# Patient Record
Sex: FEMALE | Race: WHITE | NOT HISPANIC OR LATINO | Employment: OTHER | URBAN - METROPOLITAN AREA
[De-identification: names, ages, dates, MRNs, and addresses within clinical notes are randomized per-mention and may not be internally consistent; named-entity substitution may affect disease eponyms.]

---

## 2017-03-02 ENCOUNTER — TRANSCRIBE ORDERS (OUTPATIENT)
Dept: ADMINISTRATIVE | Facility: HOSPITAL | Age: 62
End: 2017-03-02

## 2017-03-02 DIAGNOSIS — H93.19 TINNITUS, UNSPECIFIED LATERALITY: ICD-10-CM

## 2017-03-02 DIAGNOSIS — Z13.9 SCREENING: Primary | ICD-10-CM

## 2017-03-02 DIAGNOSIS — H91.90 HEARING LOSS, UNSPECIFIED LATERALITY: Primary | ICD-10-CM

## 2017-03-07 ENCOUNTER — TRANSCRIBE ORDERS (OUTPATIENT)
Dept: ADMINISTRATIVE | Facility: HOSPITAL | Age: 62
End: 2017-03-07

## 2017-03-07 DIAGNOSIS — H93.19 TINNITUS, UNSPECIFIED LATERALITY: Primary | ICD-10-CM

## 2017-03-07 DIAGNOSIS — H91.90 HEARING LOSS, UNSPECIFIED LATERALITY: ICD-10-CM

## 2017-03-08 ENCOUNTER — APPOINTMENT (OUTPATIENT)
Dept: CT IMAGING | Facility: HOSPITAL | Age: 62
End: 2017-03-08

## 2017-03-08 ENCOUNTER — HOSPITAL ENCOUNTER (OUTPATIENT)
Dept: CT IMAGING | Facility: HOSPITAL | Age: 62
End: 2017-03-08

## 2017-03-10 ENCOUNTER — HOSPITAL ENCOUNTER (OUTPATIENT)
Dept: MRI IMAGING | Facility: HOSPITAL | Age: 62
Discharge: HOME | End: 2017-03-10

## 2017-03-10 DIAGNOSIS — H91.90 HEARING LOSS, UNSPECIFIED LATERALITY: ICD-10-CM

## 2017-03-10 DIAGNOSIS — H93.19 TINNITUS, UNSPECIFIED LATERALITY: ICD-10-CM

## 2017-03-13 ENCOUNTER — HOSPITAL ENCOUNTER (OUTPATIENT)
Dept: MRI IMAGING | Facility: HOSPITAL | Age: 62
Discharge: HOME | End: 2017-03-13
Admitting: NURSE PRACTITIONER

## 2017-03-13 PROCEDURE — 0 GADOBENATE DIMEGLUMINE 529 MG/ML SOLUTION: Performed by: NURSE PRACTITIONER

## 2017-03-13 PROCEDURE — A9577 INJ MULTIHANCE: HCPCS | Performed by: NURSE PRACTITIONER

## 2017-03-13 PROCEDURE — 70553 MRI BRAIN STEM W/O & W/DYE: CPT

## 2017-03-13 RX ADMIN — GADOBENATE DIMEGLUMINE 19 ML: 529 INJECTION, SOLUTION INTRAVENOUS at 11:53

## 2017-12-19 ENCOUNTER — TRANSCRIBE ORDERS (OUTPATIENT)
Dept: ADMINISTRATIVE | Facility: HOSPITAL | Age: 62
End: 2017-12-19

## 2017-12-19 DIAGNOSIS — Z12.31 VISIT FOR SCREENING MAMMOGRAM: Primary | ICD-10-CM

## 2018-01-09 ENCOUNTER — APPOINTMENT (OUTPATIENT)
Dept: MAMMOGRAPHY | Facility: HOSPITAL | Age: 63
End: 2018-01-09

## 2018-04-30 RX ORDER — LEVOTHYROXINE SODIUM 0.05 MG/1
50 TABLET ORAL EVERY MORNING
COMMUNITY
End: 2020-05-18

## 2018-04-30 RX ORDER — LISINOPRIL 2.5 MG/1
2.5 TABLET ORAL DAILY
COMMUNITY
Start: 2017-09-29 | End: 2018-06-14

## 2018-05-01 ENCOUNTER — OFFICE VISIT (OUTPATIENT)
Dept: SURGERY | Facility: CLINIC | Age: 63
End: 2018-05-01

## 2018-05-01 ENCOUNTER — PREP FOR SURGERY (OUTPATIENT)
Dept: OTHER | Facility: HOSPITAL | Age: 63
End: 2018-05-01

## 2018-05-01 VITALS
SYSTOLIC BLOOD PRESSURE: 132 MMHG | TEMPERATURE: 98.1 F | HEIGHT: 59 IN | BODY MASS INDEX: 43.55 KG/M2 | HEART RATE: 78 BPM | WEIGHT: 216 LBS | OXYGEN SATURATION: 100 % | DIASTOLIC BLOOD PRESSURE: 80 MMHG

## 2018-05-01 DIAGNOSIS — K64.8 INTERNAL HEMORRHOIDS WITH COMPLICATION: Primary | ICD-10-CM

## 2018-05-01 DIAGNOSIS — K64.4 EXTERNAL HEMORRHOIDS WITH COMPLICATION: ICD-10-CM

## 2018-05-01 PROCEDURE — 99244 OFF/OP CNSLTJ NEW/EST MOD 40: CPT | Performed by: COLON & RECTAL SURGERY

## 2018-05-01 PROCEDURE — 46600 DIAGNOSTIC ANOSCOPY SPX: CPT | Performed by: COLON & RECTAL SURGERY

## 2018-05-01 RX ORDER — CEFAZOLIN SODIUM 1 G/50ML
1 INJECTION, SOLUTION INTRAVENOUS ONCE
Status: CANCELLED | OUTPATIENT
Start: 2018-06-21 | End: 2018-06-21

## 2018-05-01 NOTE — PROGRESS NOTES
Nadia Valverde is a 62 y.o. female who is seen as a consult at the request of Simeon Stuart* for extra anal tissue and blood per rectum    HPI:    Pt c/o problems with hemorrhoids  She sees blood with every BM for the past 2-3 years    Sh states extra tissue at anus will not go back in    She uses otc creams and wipes, but symptoms persist    BMs are regular    She states she eats a high-fiber diet  She eats prunes for her BMs, which is helpful    Most recent colonoscopy Dr. Conn 2015: Inflamed tubular adenoma descending colon    FamHx: CRC mother    Next appt for thyroid levels in September    Past Medical History:   Diagnosis Date   • Arthritis    • Colon polyps    • Cystocele with rectocele    • Dyspepsia    • Gastritis    • Hearing loss    • Heartburn    • History of cervical dysplasia    • Hypertension    • Hyperthyroidism    • Mastodynia    • Migraine    • Ovarian cyst 11/2015    RIGHT   • Primary osteoarthritis of right knee    • Reflux esophagitis    • Tinnitus    • Uterine prolapse    • Varicose veins of bilateral lower extremities with other complications        Past Surgical History:   Procedure Laterality Date   • COLONOSCOPY W/ POLYPECTOMY N/A 12/04/2015    SIGMOID DIVERTICULOSIS, 1 TUBULAR ADENOMA COLON POLYP, RESCOPE IN 5 YRS, DR. SIMEON CONN   • D&C HYSTEROSCOPY N/A    • ENDOSCOPY N/A 4/29/2016    NODULAR GASTRITIS, REFLUX ESOPHAGITIS, BX:BENIGN, DR. SIMEON CONN AT [facility]   • LAPAROSCOPIC TUBAL LIGATION     • TUBAL ABDOMINAL LIGATION Bilateral    • VEIN LIGATION AND STRIPPING Left        Social History:   reports that she has quit smoking. She has never used smokeless tobacco. She reports that she drinks alcohol. She reports that she does not use drugs.      Marriage status:     Family History   Problem Relation Age of Onset   • Heart disease Father    • Diabetes Mother    • Colon cancer Mother    • Colon polyps Mother    • Hypertension Sister    • Colon polyps Sister     • Hypertension Sister          Current Outpatient Prescriptions:   •  Calcium 150 MG tablet, Take 1 tablet by mouth 1 (One) Time As Needed., Disp: , Rfl:   •  levothyroxine (SYNTHROID, LEVOTHROID) 50 MCG tablet, Take 50 mcg by mouth Daily., Disp: , Rfl:   •  RANITIDINE HCL PO, Take 1 tablet by mouth As Needed., Disp: , Rfl:   •  lisinopril (PRINIVIL,ZESTRIL) 2.5 MG tablet, Take 2.5 mg by mouth Daily., Disp: , Rfl:     Allergy  Levaquin [levofloxacin]    Review of Systems   HENT: Positive for hearing loss.    Respiratory: Positive for snoring.    Musculoskeletal: Positive for arthritis, back pain, joint pain and joint swelling.   Neurological: Positive for dizziness and headaches.   All other systems reviewed and are negative.      Vitals:    05/01/18 0922   BP: 132/80   Pulse: 78   Temp: 98.1 °F (36.7 °C)   SpO2: 100%     Body mass index is 43.63 kg/m².    Physical Exam   Constitutional: She is oriented to person, place, and time. She appears well-developed and well-nourished. No distress.   HENT:   Head: Normocephalic and atraumatic.   Nose: Nose normal.   Mouth/Throat: Oropharynx is clear and moist.   Eyes: Conjunctivae and EOM are normal. Pupils are equal, round, and reactive to light.   Neck: Normal range of motion. No tracheal deviation present.   Pulmonary/Chest: Effort normal and breath sounds normal. No respiratory distress.   Abdominal: Soft. Bowel sounds are normal. She exhibits no distension.   Genitourinary:   Genitourinary Comments: Perianal exam: external hem - enlarged,  IH prolapsing  ARON- good tone, no masses  Anoscopy performed:  Grade 3 x 3 internal hem   Musculoskeletal: Normal range of motion. She exhibits no edema or deformity.   Neurological: She is alert and oriented to person, place, and time. No cranial nerve deficit. Coordination and gait normal.   Skin: Skin is warm and dry.   Psychiatric: She has a normal mood and affect. Her behavior is normal. Judgment normal.       Review of Medical  Record:  I reviewed colonoscopy Dr. Sheriff 2015: inflamed tubular adenoma descending colon    Assessment:  1. Internal hemorrhoids with complication    2. External hemorrhoids with complication        Plan:    I recommend hemorrhoidectomy.  I discussed risk including bleeding, infection, injury to sphincters; benefits; and alternatives.  I discussed in detail expected recovery, possible urinary retention, time off activities, and healing.  Patient expresses understanding and wishes to proceed.    In the meantime, I recommend fiber therapy and detailed and gave written instructions on how to achieve a high fiber diet.      Scribed for Valery Alanis MD by Brianda Ragsdale PA-C 5/1/2018  This patient was evaluated by me, recommendations made, documentation reviewed, edited, and revised by me, Valery Alanis MD

## 2018-06-14 ENCOUNTER — APPOINTMENT (OUTPATIENT)
Dept: PREADMISSION TESTING | Facility: HOSPITAL | Age: 63
End: 2018-06-14

## 2018-06-14 VITALS
TEMPERATURE: 97.6 F | BODY MASS INDEX: 41.98 KG/M2 | OXYGEN SATURATION: 97 % | RESPIRATION RATE: 16 BRPM | SYSTOLIC BLOOD PRESSURE: 121 MMHG | HEIGHT: 60 IN | HEART RATE: 74 BPM | WEIGHT: 213.8 LBS | DIASTOLIC BLOOD PRESSURE: 84 MMHG

## 2018-06-14 LAB
ANION GAP SERPL CALCULATED.3IONS-SCNC: 12.2 MMOL/L
BUN BLD-MCNC: 16 MG/DL (ref 8–23)
BUN/CREAT SERPL: 21.9 (ref 7–25)
CALCIUM SPEC-SCNC: 9.2 MG/DL (ref 8.6–10.5)
CHLORIDE SERPL-SCNC: 105 MMOL/L (ref 98–107)
CO2 SERPL-SCNC: 23.8 MMOL/L (ref 22–29)
CREAT BLD-MCNC: 0.73 MG/DL (ref 0.57–1)
DEPRECATED RDW RBC AUTO: 51.3 FL (ref 37–54)
ERYTHROCYTE [DISTWIDTH] IN BLOOD BY AUTOMATED COUNT: 16.1 % (ref 11.7–13)
GFR SERPL CREATININE-BSD FRML MDRD: 81 ML/MIN/1.73
GLUCOSE BLD-MCNC: 115 MG/DL (ref 65–99)
HCT VFR BLD AUTO: 41 % (ref 35.6–45.5)
HGB BLD-MCNC: 12.6 G/DL (ref 11.9–15.5)
MCH RBC QN AUTO: 26.4 PG (ref 26.9–32)
MCHC RBC AUTO-ENTMCNC: 30.7 G/DL (ref 32.4–36.3)
MCV RBC AUTO: 86 FL (ref 80.5–98.2)
PLATELET # BLD AUTO: 218 10*3/MM3 (ref 140–500)
PMV BLD AUTO: 10.8 FL (ref 6–12)
POTASSIUM BLD-SCNC: 3.7 MMOL/L (ref 3.5–5.2)
RBC # BLD AUTO: 4.77 10*6/MM3 (ref 3.9–5.2)
SODIUM BLD-SCNC: 141 MMOL/L (ref 136–145)
WBC NRBC COR # BLD: 7.49 10*3/MM3 (ref 4.5–10.7)

## 2018-06-14 PROCEDURE — 93005 ELECTROCARDIOGRAM TRACING: CPT

## 2018-06-14 PROCEDURE — 85027 COMPLETE CBC AUTOMATED: CPT | Performed by: COLON & RECTAL SURGERY

## 2018-06-14 PROCEDURE — 80048 BASIC METABOLIC PNL TOTAL CA: CPT | Performed by: COLON & RECTAL SURGERY

## 2018-06-14 PROCEDURE — 93010 ELECTROCARDIOGRAM REPORT: CPT | Performed by: INTERNAL MEDICINE

## 2018-06-14 PROCEDURE — 36415 COLL VENOUS BLD VENIPUNCTURE: CPT

## 2018-06-14 RX ORDER — RANITIDINE 150 MG/1
150 TABLET ORAL AS NEEDED
COMMUNITY
End: 2020-05-18

## 2018-06-14 NOTE — DISCHARGE INSTRUCTIONS
Take the following medications the morning of surgery with a small sip of water:  NONE    Arrive to hospital on your day of surgery at 5:30 AM.      General Instructions:  • Do not eat solid food after midnight the night before surgery.  • You may drink clear liquids day of surgery but must stop at least one hour before your hospital arrival time (NOTHING AFTER 4:30 AM)  • It is beneficial for you to have a clear drink that contains carbohydrates the day of surgery.  We suggest a 12 to 20 ounce bottle of Gatorade or Powerade for non-diabetic patients or a 12 to 20 ounce bottle of G2 or Powerade Zero for diabetic patients. (Pediatric patients, are not advised to drink a 12 to 20 ounce carbohydrate drink)    Clear liquids are liquids you can see through.  Nothing red in color.     Plain water                               Sports drinks  Sodas                                   Gelatin (Jell-O)  Fruit juices without pulp such as white grape juice and apple juice  Popsicles that contain no fruit or yogurt  Tea or coffee (no cream or milk added)  Gatorade / Powerade  G2 / Powerade Zero    • Infants may have breast milk up to four hours before surgery.  • Infants drinking formula may drink formula up to six hours before surgery.   • Patients who avoid smoking, chewing tobacco and alcohol for 4 weeks prior to surgery have a reduced risk of post-operative complications.  Quit smoking as many days before surgery as you can.  • Do not smoke, use chewing tobacco or drink alcohol the day of surgery.   • If applicable bring your C-PAP/ BI-PAP machine.  • Bring any papers given to you in the doctor’s office.  • Wear clean comfortable clothes and socks.  • Do not wear contact lenses or make-up.  Bring a case for your glasses.   • Bring crutches or walker if applicable.  • Remove all piercings.  Leave jewelry and any other valuables at home.  • Hair extensions with metal clips must be removed prior to surgery.  • The Pre-Admission  Testing nurse will instruct you to bring medications if unable to obtain an accurate list in Pre-Admission Testing.        If you were given a blood bank ID arm band remember to bring it with you the day of surgery.    Preventing a Surgical Site Infection:  • For 2 to 3 days before surgery, avoid shaving with a razor because the razor can irritate skin and make it easier to develop an infection.  • The night prior to surgery sleep in a clean bed with clean clothing.  Do not allow pets to sleep with you.  • Shower on the morning of surgery using a fresh bar of anti-bacterial soap (such as Dial) and clean washcloth.  Dry with a clean towel and dress in clean clothing.  • Ask your surgeon if you will be receiving antibiotics prior to surgery.  • Make sure you, your family, and all healthcare providers clean their hands with soap and water or an alcohol based hand  before caring for you or your wound.    Day of surgery:  Upon arrival, a Pre-op nurse and Anesthesiologist will review your health history, obtain vital signs, and answer questions you may have.  The only belongings needed at this time will be your home medications and if applicable your C-PAP/BI-PAP machine.  If you are staying overnight your family can leave the rest of your belongings in the car and bring them to your room later.  A Pre-op nurse will start an IV and you may receive medication in preparation for surgery, including something to help you relax.  Your family will be able to see you in the Pre-op area.  While you are in surgery your family should notify the waiting room  if they leave the waiting room area and provide a contact phone number.    Please be aware that surgery does come with discomfort.  We want to make every effort to control your discomfort so please discuss any uncontrolled symptoms with your nurse.   Your doctor will most likely have prescribed pain medications.      If you are going home after surgery you  will receive individualized written care instructions before being discharged.  A responsible adult must drive you to and from the hospital on the day of your surgery and stay with you for 24 hours.    If you are staying overnight following surgery, you will be transported to your hospital room following the recovery period.  [facility] has all private rooms.    If you have any questions please call Pre-Admission Testing at [phone].  Deductibles and co-payments are collected on the day of service. Please be prepared to pay the required co-pay, deductible or deposit on the day of service as defined by your plan.

## 2018-06-21 ENCOUNTER — ANESTHESIA EVENT (OUTPATIENT)
Dept: PERIOP | Facility: HOSPITAL | Age: 63
End: 2018-06-21

## 2018-06-21 ENCOUNTER — ANESTHESIA (OUTPATIENT)
Dept: PERIOP | Facility: HOSPITAL | Age: 63
End: 2018-06-21

## 2018-06-21 ENCOUNTER — HOSPITAL ENCOUNTER (OUTPATIENT)
Facility: HOSPITAL | Age: 63
Setting detail: HOSPITAL OUTPATIENT SURGERY
Discharge: HOME | End: 2018-06-21
Attending: COLON & RECTAL SURGERY | Admitting: COLON & RECTAL SURGERY

## 2018-06-21 VITALS
HEIGHT: 59 IN | RESPIRATION RATE: 16 BRPM | HEART RATE: 78 BPM | DIASTOLIC BLOOD PRESSURE: 86 MMHG | SYSTOLIC BLOOD PRESSURE: 129 MMHG | BODY MASS INDEX: 42.96 KG/M2 | TEMPERATURE: 98 F | OXYGEN SATURATION: 98 % | WEIGHT: 213.13 LBS

## 2018-06-21 DIAGNOSIS — K64.8 INTERNAL HEMORRHOIDS WITH COMPLICATION: ICD-10-CM

## 2018-06-21 PROCEDURE — 25010000003 CEFAZOLIN 1-4 GM/50ML-% SOLUTION: Performed by: PHYSICIAN ASSISTANT

## 2018-06-21 PROCEDURE — 25010000002 ONDANSETRON PER 1 MG: Performed by: NURSE ANESTHETIST, CERTIFIED REGISTERED

## 2018-06-21 PROCEDURE — 25010000002 FENTANYL CITRATE (PF) 100 MCG/2ML SOLUTION: Performed by: NURSE ANESTHETIST, CERTIFIED REGISTERED

## 2018-06-21 PROCEDURE — 25010000002 DEXAMETHASONE PER 1 MG: Performed by: NURSE ANESTHETIST, CERTIFIED REGISTERED

## 2018-06-21 PROCEDURE — 25010000002 PROPOFOL 10 MG/ML EMULSION: Performed by: NURSE ANESTHETIST, CERTIFIED REGISTERED

## 2018-06-21 PROCEDURE — 88304 TISSUE EXAM BY PATHOLOGIST: CPT | Performed by: COLON & RECTAL SURGERY

## 2018-06-21 PROCEDURE — 93010 ELECTROCARDIOGRAM REPORT: CPT | Performed by: INTERNAL MEDICINE

## 2018-06-21 PROCEDURE — 93005 ELECTROCARDIOGRAM TRACING: CPT | Performed by: ANESTHESIOLOGY

## 2018-06-21 PROCEDURE — 46260 REMOVE IN/EX HEM GROUPS 2+: CPT | Performed by: COLON & RECTAL SURGERY

## 2018-06-21 PROCEDURE — 25010000002 MIDAZOLAM PER 1 MG: Performed by: ANESTHESIOLOGY

## 2018-06-21 PROCEDURE — 25010000002 SUCCINYLCHOLINE PER 20 MG: Performed by: NURSE ANESTHETIST, CERTIFIED REGISTERED

## 2018-06-21 RX ORDER — ONDANSETRON 4 MG/1
4 TABLET, FILM COATED ORAL ONCE AS NEEDED
Status: DISCONTINUED | OUTPATIENT
Start: 2018-06-21 | End: 2018-06-21 | Stop reason: HOSPADM

## 2018-06-21 RX ORDER — POLYETHYLENE GLYCOL 3350 17 G/17G
17 POWDER, FOR SOLUTION ORAL DAILY
Start: 2018-06-21 | End: 2018-07-11

## 2018-06-21 RX ORDER — OXYCODONE HYDROCHLORIDE AND ACETAMINOPHEN 5; 325 MG/1; MG/1
2 TABLET ORAL ONCE AS NEEDED
Status: COMPLETED | OUTPATIENT
Start: 2018-06-21 | End: 2018-06-21

## 2018-06-21 RX ORDER — MIDAZOLAM HYDROCHLORIDE 1 MG/ML
1 INJECTION INTRAMUSCULAR; INTRAVENOUS
Status: DISCONTINUED | OUTPATIENT
Start: 2018-06-21 | End: 2018-06-21

## 2018-06-21 RX ORDER — OXYCODONE HYDROCHLORIDE AND ACETAMINOPHEN 5; 325 MG/1; MG/1
TABLET ORAL
Qty: 42 TABLET | Refills: 0 | Status: SHIPPED | OUTPATIENT
Start: 2018-06-21 | End: 2018-07-11

## 2018-06-21 RX ORDER — LIDOCAINE HYDROCHLORIDE 10 MG/ML
0.5 INJECTION, SOLUTION EPIDURAL; INFILTRATION; INTRACAUDAL; PERINEURAL ONCE AS NEEDED
Status: DISCONTINUED | OUTPATIENT
Start: 2018-06-21 | End: 2018-06-21

## 2018-06-21 RX ORDER — MIDAZOLAM HYDROCHLORIDE 1 MG/ML
2 INJECTION INTRAMUSCULAR; INTRAVENOUS
Status: DISCONTINUED | OUTPATIENT
Start: 2018-06-21 | End: 2018-06-21 | Stop reason: HOSPADM

## 2018-06-21 RX ORDER — NALOXONE HCL 0.4 MG/ML
0.2 VIAL (ML) INJECTION AS NEEDED
Status: DISCONTINUED | OUTPATIENT
Start: 2018-06-21 | End: 2018-06-21 | Stop reason: HOSPADM

## 2018-06-21 RX ORDER — EPHEDRINE SULFATE 50 MG/ML
5 INJECTION, SOLUTION INTRAVENOUS ONCE AS NEEDED
Status: DISCONTINUED | OUTPATIENT
Start: 2018-06-21 | End: 2018-06-21 | Stop reason: HOSPADM

## 2018-06-21 RX ORDER — PROMETHAZINE HYDROCHLORIDE 25 MG/ML
12.5 INJECTION, SOLUTION INTRAMUSCULAR; INTRAVENOUS ONCE AS NEEDED
Status: DISCONTINUED | OUTPATIENT
Start: 2018-06-21 | End: 2018-06-21 | Stop reason: HOSPADM

## 2018-06-21 RX ORDER — MAGNESIUM HYDROXIDE 1200 MG/15ML
LIQUID ORAL AS NEEDED
Status: DISCONTINUED | OUTPATIENT
Start: 2018-06-21 | End: 2018-06-21 | Stop reason: HOSPADM

## 2018-06-21 RX ORDER — FENTANYL CITRATE 50 UG/ML
50 INJECTION, SOLUTION INTRAMUSCULAR; INTRAVENOUS
Status: DISCONTINUED | OUTPATIENT
Start: 2018-06-21 | End: 2018-06-21

## 2018-06-21 RX ORDER — HYDROCODONE BITARTRATE AND ACETAMINOPHEN 7.5; 325 MG/1; MG/1
1 TABLET ORAL ONCE AS NEEDED
Status: DISCONTINUED | OUTPATIENT
Start: 2018-06-21 | End: 2018-06-21 | Stop reason: HOSPADM

## 2018-06-21 RX ORDER — OMEGA-3 FATTY ACIDS/FISH OIL 300-1000MG
200 CAPSULE ORAL TAKE AS DIRECTED
COMMUNITY
End: 2021-04-19 | Stop reason: HOSPADM

## 2018-06-21 RX ORDER — SUCCINYLCHOLINE CHLORIDE 20 MG/ML
INJECTION INTRAMUSCULAR; INTRAVENOUS AS NEEDED
Status: DISCONTINUED | OUTPATIENT
Start: 2018-06-21 | End: 2018-06-21 | Stop reason: SURG

## 2018-06-21 RX ORDER — DIPHENHYDRAMINE HYDROCHLORIDE 50 MG/ML
12.5 INJECTION INTRAMUSCULAR; INTRAVENOUS
Status: DISCONTINUED | OUTPATIENT
Start: 2018-06-21 | End: 2018-06-21 | Stop reason: HOSPADM

## 2018-06-21 RX ORDER — LIDOCAINE HYDROCHLORIDE 20 MG/ML
INJECTION, SOLUTION INFILTRATION; PERINEURAL AS NEEDED
Status: DISCONTINUED | OUTPATIENT
Start: 2018-06-21 | End: 2018-06-21 | Stop reason: SURG

## 2018-06-21 RX ORDER — CEFAZOLIN SODIUM 1 G/50ML
1 INJECTION, SOLUTION INTRAVENOUS ONCE
Status: COMPLETED | OUTPATIENT
Start: 2018-06-21 | End: 2018-06-21

## 2018-06-21 RX ORDER — FENTANYL CITRATE 50 UG/ML
50 INJECTION, SOLUTION INTRAMUSCULAR; INTRAVENOUS
Status: DISCONTINUED | OUTPATIENT
Start: 2018-06-21 | End: 2018-06-21 | Stop reason: HOSPADM

## 2018-06-21 RX ORDER — FAMOTIDINE 10 MG/ML
20 INJECTION, SOLUTION INTRAVENOUS ONCE
Status: DISCONTINUED | OUTPATIENT
Start: 2018-06-21 | End: 2018-06-21

## 2018-06-21 RX ORDER — ROCURONIUM BROMIDE 10 MG/ML
INJECTION, SOLUTION INTRAVENOUS AS NEEDED
Status: DISCONTINUED | OUTPATIENT
Start: 2018-06-21 | End: 2018-06-21 | Stop reason: SURG

## 2018-06-21 RX ORDER — SODIUM CHLORIDE, SODIUM LACTATE, POTASSIUM CHLORIDE, CALCIUM CHLORIDE 600; 310; 30; 20 MG/100ML; MG/100ML; MG/100ML; MG/100ML
9 INJECTION, SOLUTION INTRAVENOUS CONTINUOUS
Status: DISCONTINUED | OUTPATIENT
Start: 2018-06-21 | End: 2018-06-21

## 2018-06-21 RX ORDER — SODIUM CHLORIDE, SODIUM LACTATE, POTASSIUM CHLORIDE, CALCIUM CHLORIDE 600; 310; 30; 20 MG/100ML; MG/100ML; MG/100ML; MG/100ML
9 INJECTION, SOLUTION INTRAVENOUS CONTINUOUS
Status: DISCONTINUED | OUTPATIENT
Start: 2018-06-21 | End: 2018-06-21 | Stop reason: HOSPADM

## 2018-06-21 RX ORDER — HYDROMORPHONE HYDROCHLORIDE 1 MG/ML
0.5 INJECTION, SOLUTION INTRAMUSCULAR; INTRAVENOUS; SUBCUTANEOUS
Status: DISCONTINUED | OUTPATIENT
Start: 2018-06-21 | End: 2018-06-21 | Stop reason: HOSPADM

## 2018-06-21 RX ORDER — SODIUM CHLORIDE 0.9 % (FLUSH) 0.9 %
1-10 SYRINGE (ML) INJECTION AS NEEDED
Status: DISCONTINUED | OUTPATIENT
Start: 2018-06-21 | End: 2018-06-21 | Stop reason: HOSPADM

## 2018-06-21 RX ORDER — HYDRALAZINE HYDROCHLORIDE 20 MG/ML
5 INJECTION INTRAMUSCULAR; INTRAVENOUS
Status: DISCONTINUED | OUTPATIENT
Start: 2018-06-21 | End: 2018-06-21 | Stop reason: HOSPADM

## 2018-06-21 RX ORDER — FENTANYL CITRATE 50 UG/ML
INJECTION, SOLUTION INTRAMUSCULAR; INTRAVENOUS AS NEEDED
Status: DISCONTINUED | OUTPATIENT
Start: 2018-06-21 | End: 2018-06-21 | Stop reason: SURG

## 2018-06-21 RX ORDER — OXYCODONE AND ACETAMINOPHEN 7.5; 325 MG/1; MG/1
1 TABLET ORAL ONCE AS NEEDED
Status: DISCONTINUED | OUTPATIENT
Start: 2018-06-21 | End: 2018-06-21 | Stop reason: HOSPADM

## 2018-06-21 RX ORDER — CALCIUM CARBONATE 200(500)MG
1 TABLET,CHEWABLE ORAL DAILY
COMMUNITY
End: 2022-10-26

## 2018-06-21 RX ORDER — ONDANSETRON 2 MG/ML
INJECTION INTRAMUSCULAR; INTRAVENOUS AS NEEDED
Status: DISCONTINUED | OUTPATIENT
Start: 2018-06-21 | End: 2018-06-21 | Stop reason: SURG

## 2018-06-21 RX ORDER — LIDOCAINE HYDROCHLORIDE 10 MG/ML
0.5 INJECTION, SOLUTION EPIDURAL; INFILTRATION; INTRACAUDAL; PERINEURAL ONCE AS NEEDED
Status: DISCONTINUED | OUTPATIENT
Start: 2018-06-21 | End: 2018-06-21 | Stop reason: HOSPADM

## 2018-06-21 RX ORDER — DEXAMETHASONE SODIUM PHOSPHATE 10 MG/ML
INJECTION INTRAMUSCULAR; INTRAVENOUS AS NEEDED
Status: DISCONTINUED | OUTPATIENT
Start: 2018-06-21 | End: 2018-06-21 | Stop reason: SURG

## 2018-06-21 RX ORDER — PROMETHAZINE HYDROCHLORIDE 25 MG/1
25 SUPPOSITORY RECTAL ONCE AS NEEDED
Status: DISCONTINUED | OUTPATIENT
Start: 2018-06-21 | End: 2018-06-21 | Stop reason: HOSPADM

## 2018-06-21 RX ORDER — SODIUM CHLORIDE 0.9 % (FLUSH) 0.9 %
1-10 SYRINGE (ML) INJECTION AS NEEDED
Status: DISCONTINUED | OUTPATIENT
Start: 2018-06-21 | End: 2018-06-21

## 2018-06-21 RX ORDER — FLUMAZENIL 0.1 MG/ML
0.2 INJECTION INTRAVENOUS AS NEEDED
Status: DISCONTINUED | OUTPATIENT
Start: 2018-06-21 | End: 2018-06-21 | Stop reason: HOSPADM

## 2018-06-21 RX ORDER — MIDAZOLAM HYDROCHLORIDE 1 MG/ML
1 INJECTION INTRAMUSCULAR; INTRAVENOUS
Status: DISCONTINUED | OUTPATIENT
Start: 2018-06-21 | End: 2018-06-21 | Stop reason: HOSPADM

## 2018-06-21 RX ORDER — PROMETHAZINE HYDROCHLORIDE 25 MG/1
12.5 TABLET ORAL ONCE AS NEEDED
Status: DISCONTINUED | OUTPATIENT
Start: 2018-06-21 | End: 2018-06-21 | Stop reason: HOSPADM

## 2018-06-21 RX ORDER — FAMOTIDINE 10 MG/ML
20 INJECTION, SOLUTION INTRAVENOUS ONCE
Status: COMPLETED | OUTPATIENT
Start: 2018-06-21 | End: 2018-06-21

## 2018-06-21 RX ORDER — LIDOCAINE 50 MG/G
OINTMENT TOPICAL EVERY 4 HOURS PRN
Qty: 1 TUBE | Refills: 5 | Status: SHIPPED | OUTPATIENT
Start: 2018-06-21 | End: 2018-07-11

## 2018-06-21 RX ORDER — ONDANSETRON 2 MG/ML
4 INJECTION INTRAMUSCULAR; INTRAVENOUS ONCE AS NEEDED
Status: COMPLETED | OUTPATIENT
Start: 2018-06-21 | End: 2018-06-21

## 2018-06-21 RX ORDER — LABETALOL HYDROCHLORIDE 5 MG/ML
5 INJECTION, SOLUTION INTRAVENOUS
Status: DISCONTINUED | OUTPATIENT
Start: 2018-06-21 | End: 2018-06-21 | Stop reason: HOSPADM

## 2018-06-21 RX ORDER — PROPOFOL 10 MG/ML
VIAL (ML) INTRAVENOUS AS NEEDED
Status: DISCONTINUED | OUTPATIENT
Start: 2018-06-21 | End: 2018-06-21 | Stop reason: SURG

## 2018-06-21 RX ORDER — PROMETHAZINE HYDROCHLORIDE 25 MG/1
25 TABLET ORAL ONCE AS NEEDED
Status: DISCONTINUED | OUTPATIENT
Start: 2018-06-21 | End: 2018-06-21 | Stop reason: HOSPADM

## 2018-06-21 RX ADMIN — LIDOCAINE HYDROCHLORIDE 50 MG: 20 INJECTION, SOLUTION INFILTRATION; PERINEURAL at 07:33

## 2018-06-21 RX ADMIN — ONDANSETRON 4 MG: 2 INJECTION INTRAMUSCULAR; INTRAVENOUS at 08:00

## 2018-06-21 RX ADMIN — METRONIDAZOLE 500 MG: 500 INJECTION, SOLUTION INTRAVENOUS at 07:06

## 2018-06-21 RX ADMIN — FENTANYL CITRATE 50 MCG: 50 INJECTION, SOLUTION INTRAMUSCULAR; INTRAVENOUS at 09:08

## 2018-06-21 RX ADMIN — MIDAZOLAM 1 MG: 1 INJECTION INTRAMUSCULAR; INTRAVENOUS at 07:06

## 2018-06-21 RX ADMIN — CEFAZOLIN SODIUM 1 G: 1 INJECTION, SOLUTION INTRAVENOUS at 07:42

## 2018-06-21 RX ADMIN — FENTANYL CITRATE 50 MCG: 50 INJECTION INTRAMUSCULAR; INTRAVENOUS at 07:29

## 2018-06-21 RX ADMIN — OXYCODONE HYDROCHLORIDE AND ACETAMINOPHEN 2 TABLET: 5; 325 TABLET ORAL at 09:37

## 2018-06-21 RX ADMIN — FENTANYL CITRATE 50 MCG: 50 INJECTION INTRAMUSCULAR; INTRAVENOUS at 07:50

## 2018-06-21 RX ADMIN — ONDANSETRON 4 MG: 2 INJECTION INTRAMUSCULAR; INTRAVENOUS at 08:42

## 2018-06-21 RX ADMIN — DEXAMETHASONE SODIUM PHOSPHATE 8 MG: 10 INJECTION INTRAMUSCULAR; INTRAVENOUS at 07:38

## 2018-06-21 RX ADMIN — SODIUM CHLORIDE, POTASSIUM CHLORIDE, SODIUM LACTATE AND CALCIUM CHLORIDE: 600; 310; 30; 20 INJECTION, SOLUTION INTRAVENOUS at 07:25

## 2018-06-21 RX ADMIN — ROCURONIUM BROMIDE 10 MG: 10 INJECTION INTRAVENOUS at 07:33

## 2018-06-21 RX ADMIN — FENTANYL CITRATE 50 MCG: 50 INJECTION, SOLUTION INTRAMUSCULAR; INTRAVENOUS at 08:49

## 2018-06-21 RX ADMIN — SODIUM CHLORIDE, POTASSIUM CHLORIDE, SODIUM LACTATE AND CALCIUM CHLORIDE 9 ML/HR: 600; 310; 30; 20 INJECTION, SOLUTION INTRAVENOUS at 06:19

## 2018-06-21 RX ADMIN — PROPOFOL 200 MG: 10 INJECTION, EMULSION INTRAVENOUS at 07:33

## 2018-06-21 RX ADMIN — SUCCINYLCHOLINE CHLORIDE 200 MG: 20 INJECTION, SOLUTION INTRAMUSCULAR; INTRAVENOUS; PARENTERAL at 07:34

## 2018-06-21 RX ADMIN — FAMOTIDINE 20 MG: 10 INJECTION, SOLUTION INTRAVENOUS at 06:41

## 2018-06-21 NOTE — H&P
Pt c/o problems with hemorrhoids  She sees blood with every BM for the past 2-3 years     Sh states extra tissue at anus will not go back in     She uses otc creams and wipes, but symptoms persist     BMs are regular     She states she eats a high-fiber diet  She eats prunes for her BMs, which is helpful     Most recent colonoscopy Dr. Conn 2015: Inflamed tubular adenoma descending colon     FamHx: CRC mother     Next appt for thyroid levels in September     Medical History        Past Medical History:   Diagnosis Date   • Arthritis     • Colon polyps     • Cystocele with rectocele     • Dyspepsia     • Gastritis     • Hearing loss     • Heartburn     • History of cervical dysplasia     • Hypertension     • Hyperthyroidism     • Mastodynia     • Migraine     • Ovarian cyst 11/2015     RIGHT   • Primary osteoarthritis of right knee     • Reflux esophagitis     • Tinnitus     • Uterine prolapse     • Varicose veins of bilateral lower extremities with other complications              Surgical History         Past Surgical History:   Procedure Laterality Date   • COLONOSCOPY W/ POLYPECTOMY N/A 12/04/2015     SIGMOID DIVERTICULOSIS, 1 TUBULAR ADENOMA COLON POLYP, RESCOPE IN 5 YRS, DR. GENARO CONN   • D&C HYSTEROSCOPY N/A     • ENDOSCOPY N/A 4/29/2016     NODULAR GASTRITIS, REFLUX ESOPHAGITIS, BX:BENIGN, DR. GENARO CONN AT [facility]   • LAPAROSCOPIC TUBAL LIGATION       • TUBAL ABDOMINAL LIGATION Bilateral     • VEIN LIGATION AND STRIPPING Left              Social History:   reports that she has quit smoking. She has never used smokeless tobacco. She reports that she drinks alcohol. She reports that she does not use drugs.        Marriage status:            Family History   Problem Relation Age of Onset   • Heart disease Father     • Diabetes Mother     • Colon cancer Mother     • Colon polyps Mother     • Hypertension Sister     • Colon polyps Sister     • Hypertension Sister              Current  "Outpatient Prescriptions:   •  Calcium 150 MG tablet, Take 1 tablet by mouth 1 (One) Time As Needed., Disp: , Rfl:   •  levothyroxine (SYNTHROID, LEVOTHROID) 50 MCG tablet, Take 50 mcg by mouth Daily., Disp: , Rfl:   •  RANITIDINE HCL PO, Take 1 tablet by mouth As Needed., Disp: , Rfl:   •  lisinopril (PRINIVIL,ZESTRIL) 2.5 MG tablet, Take 2.5 mg by mouth Daily., Disp: , Rfl:      Allergy  Levaquin [levofloxacin]     Review of Systems   HENT: Positive for hearing loss.    Respiratory: Positive for snoring.    Musculoskeletal: Positive for arthritis, back pain, joint pain and joint swelling.   Neurological: Positive for dizziness and headaches.   All other systems reviewed and are negative.        /92 (BP Location: Right arm, Patient Position: Lying)   Pulse 81   Temp 98.1 °F (36.7 °C) (Oral)   Resp 18   Ht 149.9 cm (59\")   Wt 96.7 kg (213 lb 2 oz)   SpO2 95% Comment: POST SEDATION  BMI 43.05 kg/m²        Physical Exam   Constitutional: She is oriented to person, place, and time. She appears well-developed and well-nourished. No distress.   HENT:   Head: Normocephalic and atraumatic.   Nose: Nose normal.   Mouth/Throat: Oropharynx is clear and moist.   Eyes: Conjunctivae and EOM are normal. Pupils are equal, round, and reactive to light.   Neck: Normal range of motion. No tracheal deviation present.   Pulmonary/Chest: Effort normal and breath sounds normal. No respiratory distress.   Abdominal: Soft. Bowel sounds are normal. She exhibits no distension.   Genitourinary:   Genitourinary Comments: Perianal exam: external hem - enlarged,  IH prolapsing  ARON- good tone, no masses  Anoscopy performed:  Grade 3 x 3 internal hem   Musculoskeletal: Normal range of motion. She exhibits no edema or deformity.   Neurological: She is alert and oriented to person, place, and time. No cranial nerve deficit. Coordination and gait normal.   Skin: Skin is warm and dry.   Psychiatric: She has a normal mood and affect. Her " behavior is normal. Judgment normal.         Review of Medical Record:  I reviewed colonoscopy Dr. Sheriff 2015: inflamed tubular adenoma descending colon     Assessment:  1. Internal hemorrhoids with complication    2. External hemorrhoids with complication          Plan:     I recommend hemorrhoidectomy.  I discussed risk including bleeding, infection, injury to sphincters; benefits; and alternatives.  I discussed in detail expected recovery, possible urinary retention, time off activities, and healing.  Patient expresses understanding and wishes to proceed.     In the meantime, I recommend fiber therapy and detailed and gave written instructions on how to achieve a high fiber diet.

## 2018-06-21 NOTE — PERIOPERATIVE NURSING NOTE
Pt unable to void, bladder scanned 262. Dr Lu office notified, having Dr Alanis call back for further orders

## 2018-06-21 NOTE — ANESTHESIA PROCEDURE NOTES
Airway  Urgency: elective    Airway not difficult    General Information and Staff    Patient location during procedure: OR  Anesthesiologist: DALILA LESLIE  CRNA: NAZARIO OAKLEY    Indications and Patient Condition  Indications for airway management: airway protection    Preoxygenated: yes  Mask difficulty assessment: 0 - not attempted    Final Airway Details  Final airway type: endotracheal airway      Successful airway: ETT  Cuffed: yes   Successful intubation technique: video laryngoscopy and RSI  Facilitating devices/methods: intubating stylet and anterior pressure/BURP  Endotracheal tube insertion site: oral  Blade: CMAC  ETT size: 7.0 mm  Cormack-Lehane Classification: grade I - full view of glottis  Placement verified by: chest auscultation and capnometry   Measured from: teeth  ETT to teeth (cm): 20  Number of attempts at approach: 1    Additional Comments  Atraumtic. Dentition as preop. Small mouth opening/large neck circumference. DLX1 with cricoid pressure-visualized epiglottis only. DLx2 with CMAC. Cords visualized.

## 2018-06-21 NOTE — ANESTHESIA PREPROCEDURE EVALUATION
Anesthesia Evaluation     Patient summary reviewed and Nursing notes reviewed   no history of anesthetic complications:  NPO Solid Status: > 8 hours  NPO Liquid Status: < 2 hours           Airway   Mallampati: III  TM distance: >3 FB  possible difficult intubation  Dental    (+) lower dentures    Comment: Partial lower    Pulmonary - normal exam    breath sounds clear to auscultation  (+) a smoker Former, sleep apnea,     ROS comment: Quit 37 yrs ago  snore  Cardiovascular - normal exam  Exercise tolerance: good (4-7 METS)    ECG reviewed  Rhythm: regular  Rate: normal    (-) hypertension    ROS comment: RBBB    Neuro/Psych  (+) headaches,     (-) seizures, CVA    ROS Comment: H/O Vertigo  GI/Hepatic/Renal/Endo    (+) obesity,  GERD poorly controlled,  hypothyroidism,   (-) diabetes    Musculoskeletal     (+) back pain,   Abdominal   (+) obese,    Substance History   (+) alcohol use,   (-) drug use     OB/GYN negative ob/gyn ROS         Other   (+) arthritis     ROS/Med Hx Other: Patient having chest pain in pre op - resolved shortly thereafter, patient said it is GERD related. Repeated EKG, no changes from prevoius                  Anesthesia Plan    ASA 2     MAC     intravenous induction   Anesthetic plan and risks discussed with patient.  Use of blood products discussed with patient  Consented to blood products.   Plan discussed with CRNA.

## 2018-06-21 NOTE — PERIOPERATIVE NURSING NOTE
DR KAT ON UNIT AND NOTIFIED PT C/O NAUSEA, INDIGESTION, AND CHEST PRESSURE IN PREOP. EKG DONE AND NO CHANGES FROM PREVIOUS. AA AWARE. NO NEW ORDERS.

## 2018-06-21 NOTE — PERIOPERATIVE NURSING NOTE
Relayed to pt need for f/c if unable to void, pt wishes to wait a bit longer, amb and attempt to void on own again soon

## 2018-06-21 NOTE — NURSING NOTE
DR LESLIE AT BEDSIDE SPEAKING WITH PATIENT.--REVIEWED NEW AND PREVIOUS EKG. NO CHANGES. OKAY TO PROCEED WITH SURGERY.

## 2018-06-21 NOTE — PERIOPERATIVE NURSING NOTE
PT C/O INDIGESTION, NAUSEA, AND CHEST PRESSURE. DR. LESLIE () ON UNIT AND NOTIFIED. STAT EKG ORDERED.

## 2018-06-21 NOTE — OP NOTE
DATE OF PROCEDURE: 06/21/18     PREOPERATIVE DIAGNOSES: Internal and external hemorrhoids.      POSTOPERATIVE DIAGNOSES: Internal and external hemorrhoids.      PROCEDURES PERFORMED: Internal and external hemorrhoidectomy x 3      SURGEON: Valery Alanis MD     Surgical Assistant: Brianda Ragsdale PA-C     Estimated Blood Loss: minimal    Specimens:   Order Name Source Comment Collection Info Order Time   TISSUE PATHOLOGY EXAM Hemorrhoid(s)  Collected By: Valery Alanis MD 6/21/2018  7:51 AM        INDICATIONS: This is a 62 y.o. female  who comes in with enlarged hemorrhoids. she has failed conservative therapy and wishes to have them removed. she understands the risks, benefits, and alternatives, and wishes to proceed.      DESCRIPTION OF PROCEDURE: The patient was brought into the operating room, SCDs were placed, antibiotics were infused. After adequate general anesthesia was achieved, the patient was placed prone on the operating room table. Buttocks were effaced with tape, and she was prepped and draped in sterile fashion. Then 1% lidocaine with epinephrine and 0.25% Marcaine without was used as local infiltration and a perineum block. I did a circumferential exam of the anal canal and found enlarged internal and external hemorrhoids .     I did the left lateral, right posterior, and then the right anterior. I did them in the same fashion. I made an elliptical incision around the internal and external hemorrhoid. Sweeping the sphincter muscle out of the way, I then used the Enseal to take the hemorrhoid at its base. I used a 3-0 Vicryl in a running fashion to closed the internal portion of the incision.  I then did the others in the same fashion.  I ensured good hemostasis.      All instrument, lap counts, and needle counts were correct. The patient was stable throughout the entire case and was taken to recovery.

## 2018-06-21 NOTE — ANESTHESIA POSTPROCEDURE EVALUATION
Patient: Nadia Valverde    Procedure Summary     Date:  06/21/18 Room / Location:  [facility] OR 03 / [facility] MAIN OR    Anesthesia Start:  0725 Anesthesia Stop:  0823    Procedure:  HEMORRHOIDECTOMY x3 (N/A Anus) Diagnosis:       Internal hemorrhoids with complication      (Internal hemorrhoids with complication [K64.8])    Surgeon:  Valery Alanis MD Provider:  Chang Lin MD    Anesthesia Type:  MAC ASA Status:  2          Anesthesia Type: MAC  Last vitals  BP   128/81 (06/21/18 1023)   Temp   36.7 °C (98 °F) (06/21/18 0945)   Pulse   72 (06/21/18 1023)   Resp   16 (06/21/18 1023)     SpO2   92 % (06/21/18 1023)     Post Anesthesia Care and Evaluation    Patient location during evaluation: PHASE II  Anesthetic complications: No anesthetic complications

## 2018-06-21 NOTE — DISCHARGE INSTRUCTIONS
Dr. Valery Alanis  [address] [place] [address] [address]  [place] [address]  [phone]    Discharge Instructions for Hemorrhoidectomy/Anal Fissures/Fistulas     1.  Go home, rest and take it easy today; however, you should get up and move about several times today to reduce the risk of developing a clot in your legs.          2.  You may experience some dizziness or memory loss from the anesthesia. This may last for the next 24 hours. Someone should plan on staying with you for the first 24 hours for your safety.           3.  Do not make any important legal decisions or sign any legal papers for the next 24 hours.     4.  Eat and drink lightly today. Start off with liquids, jello, soup, crackers or other bland foods at first. Please drink plenty of fluids. You may advance your diet tomorrow as tolerated as long as you do not experience any nausea or vomiting.            5.  Some patients will have packing in their rectum. It should come out with your first bowel movement. You may remove it sooner yourself if it bothers you. If it comes out on its own before your first bowel movement, do not worry about it. It does not need to be replaced.           6.  Begin your sitz baths tomorrow.      7.  The best method of pain relief is a sitz bath (sitting in a tub or warm water) at least 3 times daily for 10 minutes. This helps reduce pain and aids with hygiene/drainage. The drainage may have an unpleasant odor. This is not unexpected and should be controlled with baths and showers. If the skin around the anal area becomes irritated, you may apply Vaseline, A&D ointment or a similar barrier cream to the area.     8.   Bleeding and drainage are to be expected and may persist for as long as 2 - 4 weeks. Bleeding may occur with your bowel movements as well. Wear a cotton liner such as a Kotex pad or a panty liner inside your underwear to protect your clothing.            9.   You have received a prescription for a narcotic pain  medicine, as you will have pain following surgery. You will not be totally pain free, but your pain medicine should make the pain tolerable. Please take your pain medicine as prescribed and always take your pills with food to prevent nausea. Your pain may persist for 1 - 3 weeks. If you are having severe pain that cannot be controlled by the pain medicine, please contact me. Typically, patients  with anal fissures will have less pain than those with hemorrhoids.                    10.  The goal is for your bowel movements to be soft which will help minimize the pain. The pain medicine used to keep you comfortable may also cause  some constipation so I recommend the following:  Ÿ Miralax (17 grams) -- 1 capfull every day starting the day after surgery  Ÿ Keep taking fiber everyday (Citrucel, Metamucil, or Fiber-con) as directed.    11.   If you are unable to have a bowel movement by 2 days after surgery, try Milk of Magnesia, Magnesium Citrate, or Colace. If still unable to have a bowel movement, call the office at [phone].    12.   No driving for 24 hours and for as long as your are taking your prescription pain medicine. You may resume your activities gradually.    13.   You will need to call the office at [phone] to schedule a follow-up appointment in 10 - 21 days.    14.   Remember to contact me for any of the following:    Ÿ Fever > 101 degrees  Ÿ Severe pain that cannot be controlled by taking your pain pills  Ÿ Severe nausea or vomiting  Ÿ Significant bleeding > 1/2 cup  Ÿ Any other questions or concerns          Last dose of percocet was given for pain at 9:37am

## 2018-06-22 LAB
CYTO UR: NORMAL
LAB AP CASE REPORT: NORMAL
PATH REPORT.FINAL DX SPEC: NORMAL
PATH REPORT.GROSS SPEC: NORMAL

## 2018-06-26 ENCOUNTER — TELEPHONE (OUTPATIENT)
Dept: SURGERY | Facility: CLINIC | Age: 63
End: 2018-06-26

## 2018-06-26 NOTE — TELEPHONE ENCOUNTER
Wants to know if she can get in the pool this weekend. Has an above ground personal pool.  Had hem sx on 6-21-18

## 2018-07-11 ENCOUNTER — OFFICE VISIT (OUTPATIENT)
Dept: SURGERY | Facility: CLINIC | Age: 63
End: 2018-07-11

## 2018-07-11 VITALS
SYSTOLIC BLOOD PRESSURE: 140 MMHG | TEMPERATURE: 97.7 F | BODY MASS INDEX: 39.05 KG/M2 | WEIGHT: 193.7 LBS | HEART RATE: 82 BPM | DIASTOLIC BLOOD PRESSURE: 88 MMHG | OXYGEN SATURATION: 98 % | HEIGHT: 59 IN

## 2018-07-11 DIAGNOSIS — K64.8 INTERNAL HEMORRHOIDS WITH COMPLICATION: Primary | ICD-10-CM

## 2018-07-11 DIAGNOSIS — K64.4 EXTERNAL HEMORRHOIDS WITH COMPLICATION: ICD-10-CM

## 2018-07-11 PROCEDURE — 99024 POSTOP FOLLOW-UP VISIT: CPT | Performed by: PHYSICIAN ASSISTANT

## 2018-07-11 NOTE — PROGRESS NOTES
"Nadia Valverde is a 62 y.o. female in for follow up of Internal hemorrhoids with complication    External hemorrhoids with complication  s/p hemorrhoidectomy ×3 6/21/2018    Patient states that the first week postop, she had pain for burning and itching  Itching and burning now much better    She has been using one half tab Roxicet before bowel movements  Sitz baths helpful  Her insurance did not approve topical lidocaine until today    She was having 4-5 bowel movements per day with MiraLAX  Stool consistency soft-formed, not watery  Yesterday she discontinued MiraLAX and took 1 tab Dulcolax instead    She is not currently taking a fiber supplement    Occasionally she notes small spot of blood when she wipes  She endorses mucus drainage    No fever or chills  No nausea or vomiting    /88 (BP Location: Left arm, Patient Position: Sitting, Cuff Size: Adult)   Pulse 82   Temp 97.7 °F (36.5 °C) (Oral)   Ht 149.9 cm (59\")   Wt 87.9 kg (193 lb 11.2 oz)   LMP  (LMP Unknown)   SpO2 98%   Breastfeeding? No   BMI 39.12 kg/m²   Body mass index is 39.12 kg/m².      PE:  Physical Exam   Constitutional: She is oriented to person, place, and time. She appears well-developed and well-nourished. No distress.   HENT:   Head: Normocephalic and atraumatic.   Eyes: Pupils are equal, round, and reactive to light.   Neck: No tracheal deviation present.   Pulmonary/Chest: Effort normal. No respiratory distress.   Abdominal: She exhibits no distension.   Genitourinary:   Genitourinary Comments: Perianal exam: external: healing well with mild edema appropriate to stage of healing.  No blood or purulence.  No appearance infection   Neurological: She is alert and oriented to person, place, and time.   Skin: Skin is warm and dry. She is not diaphoretic.   Psychiatric: She has a normal mood and affect. Her behavior is normal.   Vitals reviewed.        Assessment:   1. Internal hemorrhoids with complication    2. External hemorrhoids " with complication    s/p hemorrhoidectomy ×3 6/21/2018    Plan:    She is healing well postop.  Path: benign hemorrhoids.  Recommended she discontinue stool softener due to frequent loose stools.  To bulk up stools, I recommend fiber therapy and detailed and gave written instructions on how to achieve a high fiber diet.    She can begin lifting 20 pounds this week, and at 5 pounds every week thereafter.  Recommended she transition to over-the-counter pain medication.  She can continue sitz baths and topical lidocaine.      RTC 5 weeks      Brianda Ragsdale PA-C 7/11/2018  9:57 AM

## 2018-08-21 ENCOUNTER — OFFICE VISIT (OUTPATIENT)
Dept: SURGERY | Facility: CLINIC | Age: 63
End: 2018-08-21

## 2018-08-21 VITALS
OXYGEN SATURATION: 92 % | RESPIRATION RATE: 18 BRPM | TEMPERATURE: 98 F | HEART RATE: 92 BPM | DIASTOLIC BLOOD PRESSURE: 80 MMHG | WEIGHT: 211 LBS | HEIGHT: 59 IN | SYSTOLIC BLOOD PRESSURE: 120 MMHG | BODY MASS INDEX: 42.54 KG/M2

## 2018-08-21 DIAGNOSIS — K64.8 INTERNAL HEMORRHOIDS WITH COMPLICATION: Primary | ICD-10-CM

## 2018-08-21 DIAGNOSIS — K64.4 EXTERNAL HEMORRHOIDS WITH COMPLICATION: ICD-10-CM

## 2018-08-21 PROCEDURE — 99024 POSTOP FOLLOW-UP VISIT: CPT | Performed by: COLON & RECTAL SURGERY

## 2018-08-21 NOTE — PROGRESS NOTES
Nadia Valverde is a 62 y.o. female in for follow up of No diagnosis found.        LMP  (LMP Unknown)   There is no height or weight on file to calculate BMI.      PE:  Physical Exam      Assessment: No diagnosis found.     Plan:        Healed    Cy   heartburn

## 2018-08-27 NOTE — PROGRESS NOTES
"Subjective:      Nadia Valverde is status post hemorrhoidectomy. Pain is controlled without any medications..  Bowel movements are regular, soft, and without any bleeding. The patient is taking stool softer. The patient is voiding without difficulty.         Objective:      /80   Pulse 92   Temp 98 °F (36.7 °C)   Resp 18   Ht 149.9 cm (59\")   Wt 95.7 kg (211 lb)   LMP  (LMP Unknown)   SpO2 92%   BMI 42.62 kg/m²   General:  normal general appearance}   INCISION:    appears to be healing well with good reapproximation    Edema:   no       Assessment:      Status post hemorrhoidectomy      Plan:      1. Continue with bowel regimen     2. Frequent warm sitzbaths    3. Follow up: as needed.      Cy 2020 last 2015 one polyp  "

## 2018-11-14 ENCOUNTER — OFFICE VISIT (OUTPATIENT)
Dept: SURGERY | Facility: CLINIC | Age: 63
End: 2018-11-14

## 2018-11-14 VITALS
SYSTOLIC BLOOD PRESSURE: 166 MMHG | HEART RATE: 80 BPM | BODY MASS INDEX: 42.38 KG/M2 | DIASTOLIC BLOOD PRESSURE: 88 MMHG | HEIGHT: 59 IN | TEMPERATURE: 98 F | WEIGHT: 210.2 LBS | OXYGEN SATURATION: 98 %

## 2018-11-14 DIAGNOSIS — K62.5 RECTAL BLEEDING: ICD-10-CM

## 2018-11-14 DIAGNOSIS — K64.8 INTERNAL HEMORRHOIDS WITH COMPLICATION: Primary | ICD-10-CM

## 2018-11-14 PROCEDURE — 99213 OFFICE O/P EST LOW 20 MIN: CPT | Performed by: PHYSICIAN ASSISTANT

## 2018-11-14 RX ORDER — LISINOPRIL 2.5 MG/1
TABLET ORAL
COMMUNITY
Start: 2017-09-29 | End: 2020-05-18

## 2018-11-14 RX ORDER — HYDROCORTISONE ACETATE 25 MG/1
25 SUPPOSITORY RECTAL EVERY 12 HOURS
Qty: 14 SUPPOSITORY | Refills: 1 | Status: SHIPPED | OUTPATIENT
Start: 2018-11-14 | End: 2019-02-13 | Stop reason: SDUPTHER

## 2018-11-14 NOTE — PROGRESS NOTES
"Nadia Valverde is a 63 y.o. female in for Rectal bleeding    Internal hemorrhoids with complication    Pt states when she wipes after BM, she sees a small amount of bright red blood  Started about a month ago, and now is daily    No pain in her bottom  Occasional itching and burning    She has been applying anucort externally x1 week, which has been helpful    She has several BMs at a time in the morning  She is not taking any stool softeners  She is not taking a fiber supplement    Pt states she has been exercising and eating fiber in her diet    She is s/p hemorrhoidectomy ×3 6/21/2018    Her most recent colonoscopy 2015 with Dr. Sheriff: +polyps    /88 (BP Location: Left arm, Patient Position: Sitting, Cuff Size: Adult)   Pulse 80   Temp 98 °F (36.7 °C) (Oral)   Ht 149.9 cm (59\")   Wt 95.3 kg (210 lb 3.2 oz)   LMP  (LMP Unknown)   SpO2 98%   Breastfeeding? No   BMI 42.46 kg/m²   Body mass index is 42.46 kg/m².      PE:  Physical Exam   Constitutional: She is oriented to person, place, and time. She appears well-developed and well-nourished. No distress.   HENT:   Head: Normocephalic and atraumatic.   Eyes: Pupils are equal, round, and reactive to light.   Neck: No tracheal deviation present.   Pulmonary/Chest: Effort normal. No respiratory distress.   Abdominal: She exhibits no distension.   Genitourinary:   Genitourinary Comments: Perianal exam: external: +tags  ARON: good tone, no masses  Anoscopy: grade II right posterior internal hemorrhoid with moderate irritation.  Incomplete visualization due to patient discomfort   Neurological: She is alert and oriented to person, place, and time.   Skin: Skin is warm and dry. She is not diaphoretic.   Psychiatric: She has a normal mood and affect. Her behavior is normal.   Vitals reviewed.        Assessment:   1. Rectal bleeding    2. Internal hemorrhoids with complication         Plan:    To help bulk stools, I recommend fiber therapy and detailed and " gave written instructions on how to achieve a high fiber diet.  For irritated internal hemorrhoid, rx Anusol and discussed instructions.    Consider colonoscopy if no improvement with conservative medical management.    Call or come in if symptoms worsen or do not improve    RTC 6 weeks      Brianda Ragsdale PA-C  11/15/2018     20 minutes spent face-to-face with patient; 12 of 20 minutes spent in consultation

## 2018-12-24 ENCOUNTER — HOSPITAL ENCOUNTER (EMERGENCY)
Facility: HOSPITAL | Age: 63
Discharge: HOME | End: 2018-12-24
Attending: EMERGENCY MEDICINE | Admitting: EMERGENCY MEDICINE

## 2018-12-24 ENCOUNTER — APPOINTMENT (OUTPATIENT)
Dept: CT IMAGING | Facility: HOSPITAL | Age: 63
End: 2018-12-24

## 2018-12-24 ENCOUNTER — APPOINTMENT (OUTPATIENT)
Dept: GENERAL RADIOLOGY | Facility: HOSPITAL | Age: 63
End: 2018-12-24

## 2018-12-24 VITALS
RESPIRATION RATE: 16 BRPM | DIASTOLIC BLOOD PRESSURE: 74 MMHG | HEART RATE: 79 BPM | BODY MASS INDEX: 44.66 KG/M2 | OXYGEN SATURATION: 97 % | TEMPERATURE: 97.6 F | SYSTOLIC BLOOD PRESSURE: 114 MMHG | HEIGHT: 57 IN | WEIGHT: 207 LBS

## 2018-12-24 DIAGNOSIS — J32.0 MAXILLARY SINUSITIS, UNSPECIFIED CHRONICITY: ICD-10-CM

## 2018-12-24 DIAGNOSIS — J06.9 VIRAL UPPER RESPIRATORY TRACT INFECTION: Primary | ICD-10-CM

## 2018-12-24 DIAGNOSIS — G44.209 MUSCLE TENSION HEADACHE: ICD-10-CM

## 2018-12-24 LAB
ANION GAP SERPL CALCULATED.3IONS-SCNC: 12.7 MMOL/L
BASOPHILS # BLD AUTO: 0.03 10*3/MM3 (ref 0–0.2)
BASOPHILS NFR BLD AUTO: 0.4 % (ref 0–2)
BUN BLD-MCNC: 8 MG/DL (ref 8–23)
BUN/CREAT SERPL: 11.1 (ref 7–25)
CALCIUM SPEC-SCNC: 8.8 MG/DL (ref 8.8–10.5)
CHLORIDE SERPL-SCNC: 105 MMOL/L (ref 98–107)
CO2 SERPL-SCNC: 25.3 MMOL/L (ref 22–29)
CREAT BLD-MCNC: 0.72 MG/DL (ref 0.57–1)
DEPRECATED RDW RBC AUTO: 48.7 FL (ref 37–54)
EOSINOPHIL # BLD AUTO: 0.27 10*3/MM3 (ref 0.1–0.3)
EOSINOPHIL NFR BLD AUTO: 3.3 % (ref 0–4)
ERYTHROCYTE [DISTWIDTH] IN BLOOD BY AUTOMATED COUNT: 15.3 % (ref 11.5–14.5)
FLUAV AG NPH QL: NEGATIVE
FLUBV AG NPH QL IA: NEGATIVE
GFR SERPL CREATININE-BSD FRML MDRD: 82 ML/MIN/1.73
GLUCOSE BLD-MCNC: 137 MG/DL (ref 65–99)
HCT VFR BLD AUTO: 45.1 % (ref 37–47)
HGB BLD-MCNC: 14.3 G/DL (ref 12–16)
IMM GRANULOCYTES # BLD AUTO: 0.05 10*3/MM3 (ref 0–0.03)
IMM GRANULOCYTES NFR BLD AUTO: 0.6 % (ref 0–0.5)
LYMPHOCYTES # BLD AUTO: 2.93 10*3/MM3 (ref 0.6–4.8)
LYMPHOCYTES NFR BLD AUTO: 35.8 % (ref 20–45)
MCH RBC QN AUTO: 27.8 PG (ref 27–31)
MCHC RBC AUTO-ENTMCNC: 31.7 G/DL (ref 31–37)
MCV RBC AUTO: 87.6 FL (ref 81–99)
MONOCYTES # BLD AUTO: 0.35 10*3/MM3 (ref 0–1)
MONOCYTES NFR BLD AUTO: 4.3 % (ref 3–8)
NEUTROPHILS # BLD AUTO: 4.55 10*3/MM3 (ref 1.5–8.3)
NEUTROPHILS NFR BLD AUTO: 55.6 % (ref 45–70)
NRBC BLD AUTO-RTO: 0 /100 WBC (ref 0–0)
PLATELET # BLD AUTO: 211 10*3/MM3 (ref 140–500)
PMV BLD AUTO: 10.5 FL (ref 7.4–10.4)
POTASSIUM BLD-SCNC: 3.6 MMOL/L (ref 3.5–5.2)
RBC # BLD AUTO: 5.15 10*6/MM3 (ref 4.2–5.4)
SODIUM BLD-SCNC: 143 MMOL/L (ref 136–145)
WBC NRBC COR # BLD: 8.18 10*3/MM3 (ref 4.8–10.8)

## 2018-12-24 PROCEDURE — 96374 THER/PROPH/DIAG INJ IV PUSH: CPT

## 2018-12-24 PROCEDURE — 85025 COMPLETE CBC W/AUTO DIFF WBC: CPT | Performed by: EMERGENCY MEDICINE

## 2018-12-24 PROCEDURE — 99284 EMERGENCY DEPT VISIT MOD MDM: CPT

## 2018-12-24 PROCEDURE — 94640 AIRWAY INHALATION TREATMENT: CPT

## 2018-12-24 PROCEDURE — 87804 INFLUENZA ASSAY W/OPTIC: CPT | Performed by: EMERGENCY MEDICINE

## 2018-12-24 PROCEDURE — 99282 EMERGENCY DEPT VISIT SF MDM: CPT | Performed by: EMERGENCY MEDICINE

## 2018-12-24 PROCEDURE — 71046 X-RAY EXAM CHEST 2 VIEWS: CPT

## 2018-12-24 PROCEDURE — 80048 BASIC METABOLIC PNL TOTAL CA: CPT | Performed by: EMERGENCY MEDICINE

## 2018-12-24 PROCEDURE — 70450 CT HEAD/BRAIN W/O DYE: CPT

## 2018-12-24 PROCEDURE — 25010000002 KETOROLAC TROMETHAMINE PER 15 MG: Performed by: EMERGENCY MEDICINE

## 2018-12-24 RX ORDER — AMOXICILLIN AND CLAVULANATE POTASSIUM 875; 125 MG/1; MG/1
1 TABLET, FILM COATED ORAL 2 TIMES DAILY
COMMUNITY
End: 2020-05-18

## 2018-12-24 RX ORDER — AMOXICILLIN 250 MG/1
250 CAPSULE ORAL 3 TIMES DAILY
COMMUNITY
End: 2018-12-24

## 2018-12-24 RX ORDER — HYDROCODONE BITARTRATE AND ACETAMINOPHEN 5; 325 MG/1; MG/1
1-2 TABLET ORAL EVERY 4 HOURS PRN
Qty: 24 TABLET | Refills: 0 | Status: SHIPPED | OUTPATIENT
Start: 2018-12-24 | End: 2020-05-18

## 2018-12-24 RX ORDER — ALBUTEROL SULFATE 90 UG/1
2 AEROSOL, METERED RESPIRATORY (INHALATION) EVERY 4 HOURS PRN
Qty: 1 INHALER | Refills: 0 | Status: SHIPPED | OUTPATIENT
Start: 2018-12-24 | End: 2019-01-07

## 2018-12-24 RX ORDER — KETOROLAC TROMETHAMINE 30 MG/ML
30 INJECTION, SOLUTION INTRAMUSCULAR; INTRAVENOUS ONCE
Status: COMPLETED | OUTPATIENT
Start: 2018-12-24 | End: 2018-12-24

## 2018-12-24 RX ORDER — IPRATROPIUM BROMIDE AND ALBUTEROL SULFATE 2.5; .5 MG/3ML; MG/3ML
3 SOLUTION RESPIRATORY (INHALATION) ONCE
Status: COMPLETED | OUTPATIENT
Start: 2018-12-24 | End: 2018-12-24

## 2018-12-24 RX ADMIN — KETOROLAC TROMETHAMINE 30 MG: 30 INJECTION, SOLUTION INTRAMUSCULAR at 05:26

## 2018-12-24 RX ADMIN — IPRATROPIUM BROMIDE AND ALBUTEROL SULFATE 3 ML: .5; 3 SOLUTION RESPIRATORY (INHALATION) at 06:03

## 2019-01-03 ENCOUNTER — OFFICE VISIT (OUTPATIENT)
Dept: OBSTETRICS AND GYNECOLOGY | Facility: CLINIC | Age: 64
End: 2019-01-03

## 2019-01-03 VITALS
WEIGHT: 211 LBS | BODY MASS INDEX: 45.52 KG/M2 | DIASTOLIC BLOOD PRESSURE: 86 MMHG | HEIGHT: 57 IN | SYSTOLIC BLOOD PRESSURE: 138 MMHG

## 2019-01-03 DIAGNOSIS — Z11.51 SPECIAL SCREENING EXAMINATION FOR HUMAN PAPILLOMAVIRUS (HPV): ICD-10-CM

## 2019-01-03 DIAGNOSIS — Z01.419 PAP SMEAR, LOW-RISK: Primary | ICD-10-CM

## 2019-01-03 DIAGNOSIS — Z13.9 SCREENING FOR CONDITION: ICD-10-CM

## 2019-01-03 DIAGNOSIS — Z01.411 ENCOUNTER FOR GYNECOLOGICAL EXAMINATION WITH ABNORMAL FINDING: ICD-10-CM

## 2019-01-03 LAB
BILIRUB BLD-MCNC: NEGATIVE MG/DL
CLARITY, POC: CLEAR
COLOR UR: YELLOW
GLUCOSE UR STRIP-MCNC: NEGATIVE MG/DL
KETONES UR QL: NEGATIVE
LEUKOCYTE EST, POC: NEGATIVE
NITRITE UR-MCNC: NEGATIVE MG/ML
PH UR: 5 [PH] (ref 5–8)
PROT UR STRIP-MCNC: NEGATIVE MG/DL
RBC # UR STRIP: NEGATIVE /UL
SP GR UR: 1.03 (ref 1–1.03)
UROBILINOGEN UR QL: NORMAL

## 2019-01-03 PROCEDURE — 99386 PREV VISIT NEW AGE 40-64: CPT | Performed by: OBSTETRICS & GYNECOLOGY

## 2019-01-03 PROCEDURE — 81002 URINALYSIS NONAUTO W/O SCOPE: CPT | Performed by: OBSTETRICS & GYNECOLOGY

## 2019-01-03 RX ORDER — HEPATITIS A VACCINE, INACTIVATED 50 [IU]/ML
INJECTION, SUSPENSION INTRAMUSCULAR
COMMUNITY
Start: 2018-11-21 | End: 2020-05-18

## 2019-01-03 NOTE — PROGRESS NOTES
GYN Annual Exam     CC- Here for annual exam.     Nadia Valverde is a 63 y.o. female new patient who presents for annual well woman exam. She underwent menopause at age 50 and is not on any HRT. She has had abnormal  paps in the past and had a procedure that sounded like a LEEP. No  VB, no issues.       OB History      Para Term  AB Living    2 2 2     2    SAB TAB Ectopic Molar Multiple Live Births                       Obstetric Comments    2           Menarche:14  Menopause:50  HRT:none  Current contraception: post menopausal status and tubal ligation  History of abnormal Pap smear: yes -  s/p LEEP  History of abnormal mammogram: no  Family history of uterine, colon or ovarian cancer: yes - mom colon cancer age 75  Family history of breast cancer: no  STD's: none  Last pap smear:      Health Maintenance   Topic Date Due   • ANNUAL PHYSICAL  10/18/1958   • HEPATITIS A VACCINE ADULT (1 of 2) 10/18/1973   • TDAP/TD VACCINES (1 - Tdap) 10/18/1974   • HEPATITIS C SCREENING  2018   • PAP SMEAR  2018   • COLONOSCOPY  2018   • ZOSTER VACCINE (2 of 2) 2018   • MAMMOGRAM  2019   • INFLUENZA VACCINE  Completed       Past Medical History:   Diagnosis Date   • Abnormal Pap smear of cervix    • Arthritis    • At risk for sleep apnea     STOP BANG 5   • Cervical dysplasia     s/p LEEP   • Colon polyps    • Cystocele with rectocele    • Diverticulitis    • Dyspepsia    • Gastritis    • GERD (gastroesophageal reflux disease)    • Hearing loss    • Heartburn    • Hemorrhoids    • History of cervical dysplasia    • History of hypertension     OFF MED SINCE 2018   • Hypertension     STOPPED MEDS--2018.   • Hypothyroidism    • Indigestion    • Joint pain    • Mastodynia    • Migraine    • Ovarian cyst 2015    RIGHT   • Primary osteoarthritis of right knee    • Reflux esophagitis    • Slow to wake up after anesthesia    • Tinnitus     RT EAR   • Urinary tract infection      recurrent UTIs a few years ago   • Uterine prolapse    • Varicose veins of bilateral lower extremities with other complications        Past Surgical History:   Procedure Laterality Date   • CERVICAL BIOPSY  W/ LOOP ELECTRODE EXCISION     • COLONOSCOPY W/ POLYPECTOMY N/A 12/04/2015    SIGMOID DIVERTICULOSIS, 1 TUBULAR ADENOMA COLON POLYP, RESCOPE IN 5 YRS, DR. GENARO CONN   • D&C HYSTEROSCOPY N/A    • DILATATION AND CURETTAGE      DUB   • ENDOSCOPY N/A 4/29/2016    NODULAR GASTRITIS, REFLUX ESOPHAGITIS, BX:BENIGN, DR. GENARO CONN AT [facility]   • HEMORRHOIDECTOMY N/A 6/21/2018    Procedure: HEMORRHOIDECTOMY x3;  Surgeon: Valery Alanis MD;  Location: [facility] OR;  Service: General   • TUBAL ABDOMINAL LIGATION Bilateral    • VEIN LIGATION AND STRIPPING Left          Current Outpatient Medications:   •  levothyroxine (SYNTHROID, LEVOTHROID) 50 MCG tablet, Take 50 mcg by mouth Every Morning., Disp: , Rfl:   •  albuterol sulfate  (90 Base) MCG/ACT inhaler, Inhale 2 puffs Every 4 (Four) Hours As Needed for Wheezing for up to 14 days., Disp: 1 inhaler, Rfl: 0  •  amoxicillin-clavulanate (AUGMENTIN) 875-125 MG per tablet, Take 1 tablet by mouth 2 (Two) Times a Day., Disp: , Rfl:   •  Calcium 150 MG tablet, Take 1 tablet by mouth 1 (One) Time As Needed., Disp: , Rfl:   •  calcium carbonate (TUMS) 500 MG chewable tablet, Chew 1 tablet Daily., Disp: , Rfl:   •  HYDROcodone-acetaminophen (NORCO) 5-325 MG per tablet, Take 1-2 tablets by mouth Every 4 (Four) Hours As Needed (and cough) for up to 24 doses., Disp: 24 tablet, Rfl: 0  •  Ibuprofen (ADVIL) 200 MG capsule, Take 200 mg by mouth Take As Directed., Disp: , Rfl:   •  lisinopril (PRINIVIL,ZESTRIL) 2.5 MG tablet, , Disp: , Rfl:   •  raNITIdine (ZANTAC) 150 MG tablet, Take 150 mg by mouth As Needed for Heartburn., Disp: , Rfl:   •  VAQTA 50 UNIT/ML injection, , Disp: , Rfl:     Allergies   Allergen Reactions   • Levofloxacin Myalgia     Lower  "extremities  Other reaction(s): Arthralgia (Joint Pain)       Social History     Tobacco Use   • Smoking status: Former Smoker     Years: 4.00     Types: Cigarettes   • Smokeless tobacco: Never Used   • Tobacco comment: QUIT 39 YEARS AGO   Substance Use Topics   • Alcohol use: Yes     Comment: occasionally   • Drug use: No       Family History   Problem Relation Age of Onset   • Heart disease Father    • Diabetes Mother    • Colon cancer Mother 75   • Colon polyps Mother    • Hypertension Sister    • Colon polyps Sister    • Deep vein thrombosis Sister    • Pulmonary embolism Sister    • Hypertension Sister    • Malig Hyperthermia Neg Hx    • Breast cancer Neg Hx    • Ovarian cancer Neg Hx        Review of Systems   Constitutional: Negative for appetite change, fatigue, fever and unexpected weight change.   Respiratory: Negative for cough and shortness of breath.    Cardiovascular: Negative for chest pain and palpitations.   Gastrointestinal: Negative for abdominal distention, abdominal pain, constipation, diarrhea and nausea.   Endocrine: Negative for cold intolerance and heat intolerance.   Genitourinary: Negative for dyspareunia, dysuria, menstrual problem, pelvic pain, vaginal discharge and vaginal pain.   Skin: Negative for color change and rash.   Neurological: Negative for headaches.   Hematological: Negative for adenopathy. Does not bruise/bleed easily.   Psychiatric/Behavioral: Negative for dysphoric mood. The patient is not nervous/anxious.        /86   Ht 144.8 cm (57\")   Wt 95.7 kg (211 lb)   LMP  (LMP Unknown)   BMI 45.66 kg/m²     Physical Exam   Constitutional: She is oriented to person, place, and time. She appears well-developed and well-nourished.   HENT:   Head: Normocephalic and atraumatic.   Eyes: Conjunctivae are normal. No scleral icterus.   Neck: Neck supple. No thyromegaly present.   Cardiovascular: Normal rate and regular rhythm.   Pulmonary/Chest: Effort normal and breath sounds " normal. Right breast exhibits no inverted nipple, no mass, no nipple discharge, no skin change and no tenderness. Left breast exhibits no inverted nipple, no mass, no nipple discharge, no skin change and no tenderness.   Abdominal: Soft. Bowel sounds are normal. She exhibits no distension and no mass. There is no tenderness. There is no rebound and no guarding. No hernia.   Genitourinary: Uterus normal. Pelvic exam was performed with patient supine. There is no rash, tenderness or lesion on the right labia. There is no rash, tenderness or lesion on the left labia. Cervix exhibits no motion tenderness, no discharge and no friability. Right adnexum displays no mass, no tenderness and no fullness. Left adnexum displays no mass, no tenderness and no fullness. No erythema, tenderness or bleeding in the vagina. No foreign body in the vagina. No signs of injury around the vagina. No vaginal discharge found.   Genitourinary Comments: Grade 2 cystocele and uterine prolapse   Neurological: She is alert and oriented to person, place, and time.   Skin: Skin is warm and dry.   Psychiatric: She has a normal mood and affect. Her behavior is normal. Judgment and thought content normal.   Nursing note and vitals reviewed.         Assessment/Plan    1) GYN HM: check pap/HPV   SBE demonstrated and encouraged.  2) STD screening: declines Condoms encouraged.  3) Bone health - Weight bearing exercise, dietary calcium recommendations and vitamin D reviewed.   4) Diet and Exercise discussed  5) Smoking Status: No   6) Social: no issues  7)MMG:  schedule  8) DEXA-schedule  9)C scope- UTD 4/2016, repeat 5 years  10) POP- pt asymptomatic.    Follow up prn and 1 year       Nadia was seen today for gynecologic exam.    Diagnoses and all orders for this visit:    Pap smear, low-risk  -     Pap IG, HPV-hr    Screening for condition  -     POC Urinalysis Dipstick  -     Mammo Screening Bilateral With CAD; Future  -     DEXA Bone Density Axial;  Future    Special screening examination for human papillomavirus (HPV)  -     Pap IG, HPV-hr    Encounter for gynecological examination with abnormal finding        Mylene Davis MD  1/3/19  4:56 PM

## 2019-01-08 LAB
CYTOLOGIST CVX/VAG CYTO: NORMAL
CYTOLOGY CVX/VAG DOC THIN PREP: NORMAL
DX ICD CODE: NORMAL
HIV 1 & 2 AB SER-IMP: NORMAL
HPV I/H RISK 1 DNA CVX QL PROBE+SIG AMP: NEGATIVE
Lab: NORMAL
OTHER STN SPEC: NORMAL
PATH REPORT.FINAL DX SPEC: NORMAL
STAT OF ADQ CVX/VAG CYTO-IMP: NORMAL

## 2019-01-09 ENCOUNTER — APPOINTMENT (OUTPATIENT)
Dept: BONE DENSITY | Facility: HOSPITAL | Age: 64
End: 2019-01-09
Attending: OBSTETRICS & GYNECOLOGY

## 2019-01-09 ENCOUNTER — APPOINTMENT (OUTPATIENT)
Dept: MAMMOGRAPHY | Facility: HOSPITAL | Age: 64
End: 2019-01-09
Attending: OBSTETRICS & GYNECOLOGY

## 2019-01-15 ENCOUNTER — APPOINTMENT (OUTPATIENT)
Dept: BONE DENSITY | Facility: HOSPITAL | Age: 64
End: 2019-01-15
Attending: OBSTETRICS & GYNECOLOGY

## 2019-01-15 ENCOUNTER — HOSPITAL ENCOUNTER (OUTPATIENT)
Dept: MAMMOGRAPHY | Facility: HOSPITAL | Age: 64
Discharge: HOME | End: 2019-01-15
Attending: OBSTETRICS & GYNECOLOGY | Admitting: OBSTETRICS & GYNECOLOGY

## 2019-01-15 DIAGNOSIS — Z13.9 SCREENING FOR CONDITION: ICD-10-CM

## 2019-01-15 PROCEDURE — 77080 DXA BONE DENSITY AXIAL: CPT

## 2019-01-15 PROCEDURE — 77067 SCR MAMMO BI INCL CAD: CPT

## 2019-01-15 PROCEDURE — 77063 BREAST TOMOSYNTHESIS BI: CPT

## 2019-02-13 RX ORDER — HYDROCORTISONE ACETATE 25 MG
SUPPOSITORY, RECTAL RECTAL
Qty: 14 SUPPOSITORY | Refills: 0 | Status: SHIPPED | OUTPATIENT
Start: 2019-02-13 | End: 2020-05-18

## 2019-02-13 NOTE — TELEPHONE ENCOUNTER
Will authorize 1 refill.  She needs to make f/u appt if she is having more problems.     Brianda Ragsdale PA-C 2/13/2019  5:37 PM

## 2019-10-30 ENCOUNTER — APPOINTMENT (OUTPATIENT)
Dept: CT IMAGING | Facility: HOSPITAL | Age: 64
End: 2019-10-30

## 2019-10-30 ENCOUNTER — HOSPITAL ENCOUNTER (EMERGENCY)
Facility: HOSPITAL | Age: 64
Discharge: HOME | End: 2019-10-31
Attending: EMERGENCY MEDICINE | Admitting: EMERGENCY MEDICINE

## 2019-10-30 DIAGNOSIS — R10.11 RIGHT UPPER QUADRANT ABDOMINAL PAIN: Primary | ICD-10-CM

## 2019-10-30 LAB
ALBUMIN SERPL-MCNC: 4 G/DL (ref 3.5–5.2)
ALBUMIN/GLOB SERPL: 1.4 G/DL
ALP SERPL-CCNC: 78 U/L (ref 39–117)
ALT SERPL W P-5'-P-CCNC: 12 U/L (ref 1–33)
ANION GAP SERPL CALCULATED.3IONS-SCNC: 13.3 MMOL/L (ref 5–15)
AST SERPL-CCNC: 15 U/L (ref 1–32)
BACTERIA UR QL AUTO: ABNORMAL /HPF
BASOPHILS # BLD AUTO: 0.07 10*3/MM3 (ref 0–0.2)
BASOPHILS NFR BLD AUTO: 0.8 % (ref 0–1.5)
BILIRUB SERPL-MCNC: 0.2 MG/DL (ref 0.2–1.2)
BILIRUB UR QL STRIP: NEGATIVE
BUN BLD-MCNC: 12 MG/DL (ref 8–23)
BUN/CREAT SERPL: 12.2 (ref 7–25)
CALCIUM SPEC-SCNC: 9.4 MG/DL (ref 8.6–10.5)
CHLORIDE SERPL-SCNC: 106 MMOL/L (ref 98–107)
CLARITY UR: CLEAR
CO2 SERPL-SCNC: 23.7 MMOL/L (ref 22–29)
COLOR UR: YELLOW
CREAT BLD-MCNC: 0.98 MG/DL (ref 0.57–1)
DEPRECATED RDW RBC AUTO: 47 FL (ref 37–54)
EOSINOPHIL # BLD AUTO: 0.33 10*3/MM3 (ref 0–0.4)
EOSINOPHIL NFR BLD AUTO: 3.8 % (ref 0.3–6.2)
ERYTHROCYTE [DISTWIDTH] IN BLOOD BY AUTOMATED COUNT: 14.6 % (ref 12.3–15.4)
GFR SERPL CREATININE-BSD FRML MDRD: 57 ML/MIN/1.73
GLOBULIN UR ELPH-MCNC: 2.9 GM/DL
GLUCOSE BLD-MCNC: 112 MG/DL (ref 65–99)
GLUCOSE UR STRIP-MCNC: NEGATIVE MG/DL
HCT VFR BLD AUTO: 39.5 % (ref 34–46.6)
HGB BLD-MCNC: 12.7 G/DL (ref 12–15.9)
HGB UR QL STRIP.AUTO: NEGATIVE
HOLD SPECIMEN: NORMAL
HYALINE CASTS UR QL AUTO: ABNORMAL /LPF
IMM GRANULOCYTES # BLD AUTO: 0.03 10*3/MM3 (ref 0–0.05)
IMM GRANULOCYTES NFR BLD AUTO: 0.3 % (ref 0–0.5)
KETONES UR QL STRIP: NEGATIVE
LEUKOCYTE ESTERASE UR QL STRIP.AUTO: ABNORMAL
LIPASE SERPL-CCNC: 34 U/L (ref 13–60)
LYMPHOCYTES # BLD AUTO: 2.89 10*3/MM3 (ref 0.7–3.1)
LYMPHOCYTES NFR BLD AUTO: 33 % (ref 19.6–45.3)
MCH RBC QN AUTO: 28.2 PG (ref 26.6–33)
MCHC RBC AUTO-ENTMCNC: 32.2 G/DL (ref 31.5–35.7)
MCV RBC AUTO: 87.6 FL (ref 79–97)
MONOCYTES # BLD AUTO: 0.43 10*3/MM3 (ref 0.1–0.9)
MONOCYTES NFR BLD AUTO: 4.9 % (ref 5–12)
NEUTROPHILS # BLD AUTO: 5.02 10*3/MM3 (ref 1.7–7)
NEUTROPHILS NFR BLD AUTO: 57.2 % (ref 42.7–76)
NITRITE UR QL STRIP: NEGATIVE
NRBC BLD AUTO-RTO: 0 /100 WBC (ref 0–0.2)
PH UR STRIP.AUTO: 6.5 [PH] (ref 4.5–8)
PLATELET # BLD AUTO: 232 10*3/MM3 (ref 140–450)
PMV BLD AUTO: 9.8 FL (ref 6–12)
POTASSIUM BLD-SCNC: 3.5 MMOL/L (ref 3.5–5.2)
PROT SERPL-MCNC: 6.9 G/DL (ref 6–8.5)
PROT UR QL STRIP: NEGATIVE
RBC # BLD AUTO: 4.51 10*6/MM3 (ref 3.77–5.28)
RBC # UR: ABNORMAL /HPF
REF LAB TEST METHOD: ABNORMAL
SODIUM BLD-SCNC: 143 MMOL/L (ref 136–145)
SP GR UR STRIP: 1.01 (ref 1–1.03)
SQUAMOUS #/AREA URNS HPF: ABNORMAL /HPF
UROBILINOGEN UR QL STRIP: ABNORMAL
WBC NRBC COR # BLD: 8.77 10*3/MM3 (ref 3.4–10.8)
WBC UR QL AUTO: ABNORMAL /HPF
WHOLE BLOOD HOLD SPECIMEN: NORMAL
WHOLE BLOOD HOLD SPECIMEN: NORMAL

## 2019-10-30 PROCEDURE — 80053 COMPREHEN METABOLIC PANEL: CPT | Performed by: EMERGENCY MEDICINE

## 2019-10-30 PROCEDURE — 81001 URINALYSIS AUTO W/SCOPE: CPT | Performed by: EMERGENCY MEDICINE

## 2019-10-30 PROCEDURE — 99284 EMERGENCY DEPT VISIT MOD MDM: CPT

## 2019-10-30 PROCEDURE — 83690 ASSAY OF LIPASE: CPT | Performed by: EMERGENCY MEDICINE

## 2019-10-30 PROCEDURE — 99284 EMERGENCY DEPT VISIT MOD MDM: CPT | Performed by: EMERGENCY MEDICINE

## 2019-10-30 PROCEDURE — 0 IOPAMIDOL PER 1 ML: Performed by: EMERGENCY MEDICINE

## 2019-10-30 PROCEDURE — 74177 CT ABD & PELVIS W/CONTRAST: CPT

## 2019-10-30 PROCEDURE — 85025 COMPLETE CBC W/AUTO DIFF WBC: CPT | Performed by: EMERGENCY MEDICINE

## 2019-10-30 RX ORDER — DICYCLOMINE HYDROCHLORIDE 10 MG/1
10 CAPSULE ORAL 4 TIMES DAILY PRN
Qty: 20 CAPSULE | Refills: 0 | Status: SHIPPED | OUTPATIENT
Start: 2019-10-30 | End: 2019-11-04

## 2019-10-30 RX ORDER — SODIUM CHLORIDE 0.9 % (FLUSH) 0.9 %
10 SYRINGE (ML) INJECTION AS NEEDED
Status: DISCONTINUED | OUTPATIENT
Start: 2019-10-30 | End: 2019-10-31 | Stop reason: HOSPADM

## 2019-10-30 RX ORDER — ONDANSETRON 4 MG/1
4 TABLET, ORALLY DISINTEGRATING ORAL 4 TIMES DAILY PRN
Qty: 20 TABLET | Refills: 0 | Status: SHIPPED | OUTPATIENT
Start: 2019-10-30 | End: 2020-05-18

## 2019-10-30 RX ORDER — HYDROCODONE BITARTRATE AND ACETAMINOPHEN 5; 325 MG/1; MG/1
1 TABLET ORAL ONCE
Status: COMPLETED | OUTPATIENT
Start: 2019-10-30 | End: 2019-10-30

## 2019-10-30 RX ADMIN — IOPAMIDOL 100 ML: 755 INJECTION, SOLUTION INTRAVENOUS at 22:28

## 2019-10-30 RX ADMIN — HYDROCODONE BITARTRATE AND ACETAMINOPHEN 1 TABLET: 5; 325 TABLET ORAL at 23:52

## 2019-10-31 VITALS
RESPIRATION RATE: 18 BRPM | DIASTOLIC BLOOD PRESSURE: 91 MMHG | OXYGEN SATURATION: 97 % | WEIGHT: 217 LBS | HEART RATE: 75 BPM | SYSTOLIC BLOOD PRESSURE: 133 MMHG | HEIGHT: 59 IN | BODY MASS INDEX: 43.75 KG/M2 | TEMPERATURE: 98.3 F

## 2019-11-15 ENCOUNTER — TRANSCRIBE ORDERS (OUTPATIENT)
Dept: ADMINISTRATIVE | Facility: HOSPITAL | Age: 64
End: 2019-11-15

## 2019-11-15 DIAGNOSIS — R10.11 RUQ PAIN: Primary | ICD-10-CM

## 2019-11-21 ENCOUNTER — APPOINTMENT (OUTPATIENT)
Dept: ULTRASOUND IMAGING | Facility: HOSPITAL | Age: 64
End: 2019-11-21

## 2019-11-26 ENCOUNTER — HOSPITAL ENCOUNTER (OUTPATIENT)
Dept: ULTRASOUND IMAGING | Facility: HOSPITAL | Age: 64
Discharge: HOME | End: 2019-11-26
Admitting: NURSE PRACTITIONER

## 2019-11-26 DIAGNOSIS — R10.11 RUQ PAIN: ICD-10-CM

## 2019-11-26 PROCEDURE — 76705 ECHO EXAM OF ABDOMEN: CPT

## 2020-02-10 ENCOUNTER — TRANSCRIBE ORDERS (OUTPATIENT)
Dept: ADMINISTRATIVE | Facility: HOSPITAL | Age: 65
End: 2020-02-10

## 2020-02-10 DIAGNOSIS — Z12.31 VISIT FOR SCREENING MAMMOGRAM: Primary | ICD-10-CM

## 2020-02-19 ENCOUNTER — APPOINTMENT (OUTPATIENT)
Dept: MAMMOGRAPHY | Facility: HOSPITAL | Age: 65
End: 2020-02-19

## 2020-02-28 ENCOUNTER — HOSPITAL ENCOUNTER (OUTPATIENT)
Dept: MAMMOGRAPHY | Facility: HOSPITAL | Age: 65
Discharge: HOME | End: 2020-02-28
Admitting: OBSTETRICS & GYNECOLOGY

## 2020-02-28 DIAGNOSIS — Z12.31 VISIT FOR SCREENING MAMMOGRAM: ICD-10-CM

## 2020-02-28 PROCEDURE — 77067 SCR MAMMO BI INCL CAD: CPT

## 2020-02-28 PROCEDURE — 77063 BREAST TOMOSYNTHESIS BI: CPT

## 2020-05-18 ENCOUNTER — OFFICE VISIT (OUTPATIENT)
Dept: OBSTETRICS AND GYNECOLOGY | Facility: CLINIC | Age: 65
End: 2020-05-18

## 2020-05-18 VITALS
BODY MASS INDEX: 41.12 KG/M2 | SYSTOLIC BLOOD PRESSURE: 140 MMHG | WEIGHT: 204 LBS | HEIGHT: 59 IN | DIASTOLIC BLOOD PRESSURE: 86 MMHG

## 2020-05-18 DIAGNOSIS — Z01.419 PAP SMEAR, LOW-RISK: Primary | ICD-10-CM

## 2020-05-18 DIAGNOSIS — Z13.9 SCREENING FOR CONDITION: ICD-10-CM

## 2020-05-18 DIAGNOSIS — N81.9 FEMALE GENITAL PROLAPSE, UNSPECIFIED TYPE: ICD-10-CM

## 2020-05-18 DIAGNOSIS — Z11.51 SPECIAL SCREENING EXAMINATION FOR HUMAN PAPILLOMAVIRUS (HPV): ICD-10-CM

## 2020-05-18 DIAGNOSIS — N81.4 UTERINE PROLAPSE: ICD-10-CM

## 2020-05-18 LAB
BILIRUB BLD-MCNC: NEGATIVE MG/DL
CLARITY, POC: CLEAR
COLOR UR: YELLOW
GLUCOSE UR STRIP-MCNC: NEGATIVE MG/DL
KETONES UR QL: NEGATIVE
LEUKOCYTE EST, POC: NEGATIVE
NITRITE UR-MCNC: NEGATIVE MG/ML
PH UR: 6 [PH] (ref 5–8)
PROT UR STRIP-MCNC: NEGATIVE MG/DL
RBC # UR STRIP: NEGATIVE /UL
SP GR UR: 1.02 (ref 1–1.03)
UROBILINOGEN UR QL: NORMAL

## 2020-05-18 PROCEDURE — 99396 PREV VISIT EST AGE 40-64: CPT | Performed by: OBSTETRICS & GYNECOLOGY

## 2020-05-18 PROCEDURE — 81002 URINALYSIS NONAUTO W/O SCOPE: CPT | Performed by: OBSTETRICS & GYNECOLOGY

## 2020-05-18 RX ORDER — LEVOTHYROXINE SODIUM 0.05 MG/1
TABLET ORAL
COMMUNITY

## 2020-05-18 RX ORDER — PHENTERMINE HYDROCHLORIDE 37.5 MG/1
TABLET ORAL
Status: ON HOLD | COMMUNITY
End: 2021-04-19

## 2020-05-18 RX ORDER — MELOXICAM 15 MG/1
TABLET ORAL
COMMUNITY

## 2020-05-18 RX ORDER — CLINDAMYCIN PHOSPHATE 10 MG/G
GEL TOPICAL
COMMUNITY
Start: 2020-04-03 | End: 2022-07-17

## 2020-05-18 NOTE — PROGRESS NOTES
GYN Annual Exam     CC- Here for annual exam.     Nadia Valverde is a 64 y.o. female est pt who presents for annual well woman exam. She underwent menopause at age 50 and is not on any HRT. No  VB.  She is trying to lose weight.  She has noticed that her prolapse is starting to be more noticeable.  We discussed options including continued weight loss and Keagle's, pessary as well as prolapse surgery.  At this point, she is going to continue with weight loss and see if that benefits her enough    OB History        2    Para   2    Term   2            AB        Living   2       SAB        TAB        Ectopic        Molar        Multiple        Live Births              Obstetric Comments   2              Menarche:14  Menopause:50  HRT:none  Current contraception: post menopausal status and tubal ligation  History of abnormal Pap smear: yes -  s/p LEEP  History of abnormal mammogram: no  Family history of uterine, colon or ovarian cancer: yes - mom colon cancer age 75  Family history of breast cancer: no  STD's: none  Last pap smear: 2019- nl pap/HPV  JOSEPH: sister DVT and PE  GRade 3 C/U       Health Maintenance   Topic Date Due   • ANNUAL PHYSICAL  10/18/1958   • TDAP/TD VACCINES (1 - Tdap) 10/18/1966   • HEPATITIS C SCREENING  2018   • COLONOSCOPY  2018   • Annual Gynecologic Pelvic and Breast Exam  2020   • INFLUENZA VACCINE  2020   • PAP SMEAR  2022   • MAMMOGRAM  2022   • ZOSTER VACCINE  Completed       Past Medical History:   Diagnosis Date   • Abnormal Pap smear of cervix    • Arthritis    • At risk for sleep apnea     STOP BANG 5   • Cervical dysplasia     s/p LEEP   • Colon polyps    • Cystocele with rectocele    • Diverticulitis    • Dyspepsia    • Gastritis    • GERD (gastroesophageal reflux disease)    • Hearing loss    • Heartburn    • Hemorrhoids    • History of cervical dysplasia    • History of hypertension     OFF MED SINCE 2018   • Hypertension      STOPPED MEDS--1/2018.   • Hypothyroidism    • Indigestion    • Joint pain    • Mastodynia    • Migraine    • Ovarian cyst 11/2015    RIGHT   • Primary osteoarthritis of right knee    • Reflux esophagitis    • Slow to wake up after anesthesia    • Tinnitus     RT EAR   • Urinary tract infection     recurrent UTIs a few years ago   • Uterine prolapse    • Varicose veins of bilateral lower extremities with other complications        Past Surgical History:   Procedure Laterality Date   • CERVICAL BIOPSY  W/ LOOP ELECTRODE EXCISION     • COLONOSCOPY W/ POLYPECTOMY N/A 12/04/2015    SIGMOID DIVERTICULOSIS, 1 TUBULAR ADENOMA COLON POLYP, RESCOPE IN 5 YRS, DR. GENARO CONN   • D&C HYSTEROSCOPY N/A    • DILATATION AND CURETTAGE      DUB   • ENDOSCOPY N/A 4/29/2016    NODULAR GASTRITIS, REFLUX ESOPHAGITIS, BX:BENIGN, DR. GENARO CONN AT [facility]   • HEMORRHOIDECTOMY N/A 6/21/2018    Procedure: HEMORRHOIDECTOMY x3;  Surgeon: Valery Alanis MD;  Location: [facility];  Service: General   • TUBAL ABDOMINAL LIGATION Bilateral    • VEIN LIGATION AND STRIPPING Left          Current Outpatient Medications:   •  calcium carbonate (TUMS) 500 MG chewable tablet, Chew 1 tablet Daily., Disp: , Rfl:   •  clindamycin (CLINDAGEL) 1 % gel, , Disp: , Rfl:   •  Ibuprofen (ADVIL) 200 MG capsule, Take 200 mg by mouth Take As Directed., Disp: , Rfl:   •  levothyroxine (Synthroid) 50 MCG tablet, Synthroid 50 mcg tablet  Take 1 tablet every day by oral route., Disp: , Rfl:   •  meloxicam (MOBIC) 15 MG tablet, meloxicam 15 mg tablet  Take 1 tablet every day by oral route., Disp: , Rfl:   •  phentermine (ADIPEX-P) 37.5 MG tablet, phentermine 37.5 mg tablet  Take 1 tablet every day by oral route., Disp: , Rfl:     Allergies   Allergen Reactions   • Levofloxacin Myalgia     Lower extremities  Other reaction(s): Arthralgia (Joint Pain)  Other reaction(s): Arthralgia (joint pain)       Social History     Tobacco Use   • Smoking status:  "Former Smoker     Years: 4.00     Types: Cigarettes   • Smokeless tobacco: Never Used   • Tobacco comment: QUIT 39 YEARS AGO   Substance Use Topics   • Alcohol use: Yes     Comment: occasionally   • Drug use: No       Family History   Problem Relation Age of Onset   • Heart disease Father    • Diabetes Mother    • Colon cancer Mother 75   • Colon polyps Mother    • Hypertension Sister    • Colon polyps Sister    • Deep vein thrombosis Sister    • Pulmonary embolism Sister    • Hypertension Sister    • Malig Hyperthermia Neg Hx    • Breast cancer Neg Hx    • Ovarian cancer Neg Hx        Review of Systems   Constitutional: Positive for unexpected weight change (loss). Negative for appetite change, fatigue and fever. Activity change: quarantine.   Respiratory: Negative for cough and shortness of breath.    Cardiovascular: Negative for chest pain and palpitations.   Gastrointestinal: Negative for abdominal distention, abdominal pain, constipation, diarrhea and nausea.   Endocrine: Negative for cold intolerance and heat intolerance.   Genitourinary: Positive for pelvic pain (some pressure in the am). Negative for dyspareunia, dysuria, menstrual problem, vaginal discharge and vaginal pain.   Skin: Negative for color change and rash.   Neurological: Negative for headaches.   Hematological: Negative for adenopathy. Does not bruise/bleed easily.   Psychiatric/Behavioral: Negative for dysphoric mood. The patient is not nervous/anxious.        /86   Ht 149.9 cm (59\")   Wt 92.5 kg (204 lb)   LMP  (LMP Unknown)   BMI 41.20 kg/m²     Physical Exam   Constitutional: She is oriented to person, place, and time. She appears well-developed and well-nourished.   HENT:   Head: Normocephalic and atraumatic.   Eyes: Conjunctivae are normal. No scleral icterus.   Neck: Neck supple. No thyromegaly present.   Cardiovascular: Normal rate and regular rhythm.   Pulmonary/Chest: Effort normal and breath sounds normal. Right breast " exhibits no inverted nipple, no mass, no nipple discharge, no skin change and no tenderness. Left breast exhibits no inverted nipple, no mass, no nipple discharge, no skin change and no tenderness.   Large, pendulous breasts   Abdominal: Soft. Bowel sounds are normal. She exhibits no distension and no mass. There is no tenderness. There is no rebound and no guarding. No hernia.   Genitourinary: Uterus normal. Pelvic exam was performed with patient supine. There is no rash, tenderness or lesion on the right labia. There is no rash, tenderness or lesion on the left labia. Cervix exhibits no motion tenderness, no discharge and no friability. Right adnexum displays no mass, no tenderness and no fullness. Left adnexum displays no mass, no tenderness and no fullness. No erythema, tenderness or bleeding in the vagina. No foreign body in the vagina. No signs of injury around the vagina. No vaginal discharge found.   Genitourinary Comments: Grade 3 cystocele and uterine prolapse  Exam limited due to habitus   Neurological: She is alert and oriented to person, place, and time.   Skin: Skin is warm and dry.   Psychiatric: She has a normal mood and affect. Her behavior is normal. Judgment and thought content normal.   Nursing note and vitals reviewed.         Assessment/Plan    1) GYN HM: normal pap/HPV  1/2019 SBE demonstrated and encouraged.  2) STD screening: declines Condoms encouraged.  3) Bone health - Weight bearing exercise, dietary calcium recommendations and vitamin D reviewed.   4) Diet and Exercise discussed  5) Smoking Status: No   6) Social: no issues  7)MMG:  UTD 2/2002- B1  8) DEXA-UTD 1/2019- normal, repeat 5 years  9)C scope- UTD 4/2016, repeat 5 years  10) POP-patient becoming more symptomatic.  Encouraged continued weight loss.  We also discussed pessary as well as surgery.  She was given written information on both of these and is advised to call for any further interest in evaluation.  We did discuss that  her physical exam has also progressed somewhat in her prolapse appears to be more pronounced this year relative to last.  11) Parts of this document have been copied or forwarded from her previous visits and have been reviewed, updated and edited as indicated.    12)Follow up prn and 1 year       Nadia was seen today for gynecologic exam.    Diagnoses and all orders for this visit:    Pap smear, low-risk  -     Cancel: Pap IG, HPV-hr    Screening for condition  -     POC Urinalysis Dipstick    Special screening examination for human papillomavirus (HPV)  -     Cancel: Pap IG, HPV-hr        Mylene Davis MD  5/18/2020  15:37

## 2020-12-22 ENCOUNTER — TELEPHONE (OUTPATIENT)
Dept: GASTROENTEROLOGY | Facility: CLINIC | Age: 65
End: 2020-12-22

## 2020-12-30 ENCOUNTER — PREP FOR SURGERY (OUTPATIENT)
Dept: OTHER | Facility: HOSPITAL | Age: 65
End: 2020-12-30

## 2020-12-30 DIAGNOSIS — Z86.010 PERSONAL HISTORY OF COLONIC POLYPS: ICD-10-CM

## 2020-12-30 DIAGNOSIS — Z12.11 ENCOUNTER FOR SCREENING FOR MALIGNANT NEOPLASM OF COLON: Primary | ICD-10-CM

## 2020-12-30 DIAGNOSIS — Z80.0 FAMILY HISTORY OF COLON CANCER IN MOTHER: ICD-10-CM

## 2020-12-30 NOTE — TELEPHONE ENCOUNTER
RETURN CALL FROM PATIENT.  SCHEDULED AT [place] 04/19/2021 AT 9:45AM - ARRIVE 8:30AM.  E-MAIL INSTRUCTIONS [email or web address].    COVID TEST ON 04/16/2021, WILL CALL WITH TIME.  NEED TO SELF QUARANTINE UNTIL AFTER PROCEDURE.  SHE UNDERSTANDS.

## 2020-12-31 PROBLEM — Z86.010 PERSONAL HISTORY OF COLONIC POLYPS: Status: ACTIVE | Noted: 2020-12-31

## 2020-12-31 PROBLEM — Z86.0100 PERSONAL HISTORY OF COLONIC POLYPS: Status: ACTIVE | Noted: 2020-12-31

## 2020-12-31 PROBLEM — Z80.0 FAMILY HISTORY OF COLON CANCER IN MOTHER: Status: ACTIVE | Noted: 2020-12-31

## 2020-12-31 PROBLEM — Z12.11 ENCOUNTER FOR SCREENING FOR MALIGNANT NEOPLASM OF COLON: Status: ACTIVE | Noted: 2020-12-31

## 2021-02-08 ENCOUNTER — TRANSCRIBE ORDERS (OUTPATIENT)
Dept: ADMINISTRATIVE | Facility: HOSPITAL | Age: 66
End: 2021-02-08

## 2021-02-08 DIAGNOSIS — Z12.31 SCREENING MAMMOGRAM, ENCOUNTER FOR: Primary | ICD-10-CM

## 2021-03-01 ENCOUNTER — HOSPITAL ENCOUNTER (OUTPATIENT)
Dept: MAMMOGRAPHY | Facility: HOSPITAL | Age: 66
Discharge: HOME | End: 2021-03-01
Admitting: OBSTETRICS & GYNECOLOGY

## 2021-03-01 DIAGNOSIS — Z12.31 SCREENING MAMMOGRAM, ENCOUNTER FOR: ICD-10-CM

## 2021-03-01 PROCEDURE — 77067 SCR MAMMO BI INCL CAD: CPT

## 2021-03-01 PROCEDURE — 77063 BREAST TOMOSYNTHESIS BI: CPT

## 2021-04-01 ENCOUNTER — TRANSCRIBE ORDERS (OUTPATIENT)
Dept: ADMINISTRATIVE | Facility: HOSPITAL | Age: 66
End: 2021-04-01

## 2021-04-01 DIAGNOSIS — U07.1 COVID-19: Primary | ICD-10-CM

## 2021-04-16 ENCOUNTER — ANESTHESIA EVENT (OUTPATIENT)
Dept: PERIOP | Facility: HOSPITAL | Age: 66
End: 2021-04-16

## 2021-04-16 ENCOUNTER — LAB (OUTPATIENT)
Dept: LAB | Facility: HOSPITAL | Age: 66
End: 2021-04-16

## 2021-04-16 DIAGNOSIS — U07.1 COVID-19: ICD-10-CM

## 2021-04-16 LAB — SARS-COV-2 RNA PNL SPEC NAA+PROBE: NOT DETECTED

## 2021-04-16 PROCEDURE — 87635 SARS-COV-2 COVID-19 AMP PRB: CPT | Performed by: OBSTETRICS & GYNECOLOGY

## 2021-04-16 PROCEDURE — C9803 HOPD COVID-19 SPEC COLLECT: HCPCS

## 2021-04-19 ENCOUNTER — HOSPITAL ENCOUNTER (OUTPATIENT)
Facility: HOSPITAL | Age: 66
Setting detail: HOSPITAL OUTPATIENT SURGERY
Discharge: HOME | End: 2021-04-19
Attending: INTERNAL MEDICINE | Admitting: INTERNAL MEDICINE

## 2021-04-19 ENCOUNTER — ANESTHESIA (OUTPATIENT)
Dept: PERIOP | Facility: HOSPITAL | Age: 66
End: 2021-04-19

## 2021-04-19 VITALS
OXYGEN SATURATION: 96 % | RESPIRATION RATE: 18 BRPM | HEART RATE: 68 BPM | WEIGHT: 216 LBS | TEMPERATURE: 97.8 F | SYSTOLIC BLOOD PRESSURE: 126 MMHG | DIASTOLIC BLOOD PRESSURE: 85 MMHG | BODY MASS INDEX: 43.63 KG/M2

## 2021-04-19 DIAGNOSIS — Z80.0 FAMILY HISTORY OF COLON CANCER IN MOTHER: ICD-10-CM

## 2021-04-19 DIAGNOSIS — Z86.010 PERSONAL HISTORY OF COLONIC POLYPS: ICD-10-CM

## 2021-04-19 DIAGNOSIS — Z12.11 ENCOUNTER FOR SCREENING FOR MALIGNANT NEOPLASM OF COLON: ICD-10-CM

## 2021-04-19 PROCEDURE — 25010000002 PROPOFOL 10 MG/ML EMULSION: Performed by: NURSE ANESTHETIST, CERTIFIED REGISTERED

## 2021-04-19 PROCEDURE — 25010000003 LIDOCAINE 1 % SOLUTION: Performed by: NURSE ANESTHETIST, CERTIFIED REGISTERED

## 2021-04-19 PROCEDURE — G0105 COLORECTAL SCRN; HI RISK IND: HCPCS | Performed by: INTERNAL MEDICINE

## 2021-04-19 RX ORDER — PROPOFOL 10 MG/ML
VIAL (ML) INTRAVENOUS AS NEEDED
Status: DISCONTINUED | OUTPATIENT
Start: 2021-04-19 | End: 2021-04-19 | Stop reason: SURG

## 2021-04-19 RX ORDER — PROPOFOL 10 MG/ML
VIAL (ML) INTRAVENOUS CONTINUOUS PRN
Status: DISCONTINUED | OUTPATIENT
Start: 2021-04-19 | End: 2021-04-19 | Stop reason: SURG

## 2021-04-19 RX ORDER — SODIUM CHLORIDE 0.9 % (FLUSH) 0.9 %
10 SYRINGE (ML) INJECTION EVERY 12 HOURS SCHEDULED
Status: DISCONTINUED | OUTPATIENT
Start: 2021-04-19 | End: 2021-04-19 | Stop reason: HOSPADM

## 2021-04-19 RX ORDER — SODIUM CHLORIDE, SODIUM LACTATE, POTASSIUM CHLORIDE, CALCIUM CHLORIDE 600; 310; 30; 20 MG/100ML; MG/100ML; MG/100ML; MG/100ML
9 INJECTION, SOLUTION INTRAVENOUS CONTINUOUS
Status: DISCONTINUED | OUTPATIENT
Start: 2021-04-19 | End: 2021-04-19 | Stop reason: HOSPADM

## 2021-04-19 RX ORDER — SODIUM CHLORIDE 9 MG/ML
40 INJECTION, SOLUTION INTRAVENOUS AS NEEDED
Status: DISCONTINUED | OUTPATIENT
Start: 2021-04-19 | End: 2021-04-19 | Stop reason: HOSPADM

## 2021-04-19 RX ORDER — LIDOCAINE HYDROCHLORIDE 10 MG/ML
0.5 INJECTION, SOLUTION EPIDURAL; INFILTRATION; INTRACAUDAL; PERINEURAL ONCE AS NEEDED
Status: DISCONTINUED | OUTPATIENT
Start: 2021-04-19 | End: 2021-04-19 | Stop reason: HOSPADM

## 2021-04-19 RX ORDER — LIDOCAINE HYDROCHLORIDE 10 MG/ML
INJECTION, SOLUTION INFILTRATION; PERINEURAL AS NEEDED
Status: DISCONTINUED | OUTPATIENT
Start: 2021-04-19 | End: 2021-04-19 | Stop reason: SURG

## 2021-04-19 RX ORDER — SODIUM CHLORIDE, SODIUM LACTATE, POTASSIUM CHLORIDE, CALCIUM CHLORIDE 600; 310; 30; 20 MG/100ML; MG/100ML; MG/100ML; MG/100ML
INJECTION, SOLUTION INTRAVENOUS CONTINUOUS PRN
Status: DISCONTINUED | OUTPATIENT
Start: 2021-04-19 | End: 2021-04-19 | Stop reason: SURG

## 2021-04-19 RX ORDER — SODIUM CHLORIDE 0.9 % (FLUSH) 0.9 %
10 SYRINGE (ML) INJECTION AS NEEDED
Status: DISCONTINUED | OUTPATIENT
Start: 2021-04-19 | End: 2021-04-19 | Stop reason: HOSPADM

## 2021-04-19 RX ADMIN — PROPOFOL 50 MG: 10 INJECTION, EMULSION INTRAVENOUS at 09:18

## 2021-04-19 RX ADMIN — PROPOFOL 50 MG: 10 INJECTION, EMULSION INTRAVENOUS at 09:14

## 2021-04-19 RX ADMIN — PROPOFOL 50 MG: 10 INJECTION, EMULSION INTRAVENOUS at 09:11

## 2021-04-19 RX ADMIN — LIDOCAINE HYDROCHLORIDE 50 MG: 10 INJECTION, SOLUTION INFILTRATION; PERINEURAL at 09:10

## 2021-04-19 RX ADMIN — PROPOFOL 30 MG: 10 INJECTION, EMULSION INTRAVENOUS at 09:29

## 2021-04-19 RX ADMIN — SODIUM CHLORIDE, POTASSIUM CHLORIDE, SODIUM LACTATE AND CALCIUM CHLORIDE: 600; 310; 30; 20 INJECTION, SOLUTION INTRAVENOUS at 09:08

## 2021-04-19 RX ADMIN — PROPOFOL 50 MG: 10 INJECTION, EMULSION INTRAVENOUS at 09:24

## 2021-04-19 RX ADMIN — PROPOFOL 50 MCG/KG/MIN: 10 INJECTION, EMULSION INTRAVENOUS at 09:12

## 2021-04-19 RX ADMIN — SODIUM CHLORIDE, POTASSIUM CHLORIDE, SODIUM LACTATE AND CALCIUM CHLORIDE 9 ML/HR: 600; 310; 30; 20 INJECTION, SOLUTION INTRAVENOUS at 08:52

## 2021-04-19 NOTE — ANESTHESIA PREPROCEDURE EVALUATION
Anesthesia Evaluation     Patient summary reviewed and Nursing notes reviewed   no history of anesthetic complications:  NPO Solid Status: > 8 hours  NPO Liquid Status: > 8 hours           Airway   Mallampati: III  TM distance: >3 FB  Neck ROM: full  possible difficult intubation and Possible difficult intubation  Dental    (+) lower dentures    Comment: Partial lower      Pulmonary - normal exam    breath sounds clear to auscultation  (+) a smoker ( quit 42 years ago) Former, sleep apnea (OSORIO not confirmed. +snoring.),     ROS comment: Quit 37 yrs ago  snore  Cardiovascular - negative cardio ROS and normal exam  Exercise tolerance: good (4-7 METS)    ECG reviewed  Rhythm: regular  Rate: normal    (-) hypertension    ROS comment: RBBB    Neuro/Psych  (+) headaches,     (-) seizures, CVA    ROS Comment: H/O Vertigo  GI/Hepatic/Renal/Endo    (+) obesity, morbid obesity, GERD well controlled,  thyroid problem hypothyroidism  (-) diabetes    Musculoskeletal     (+) back pain, neck pain ( full rom),   Abdominal   (+) obese,    Substance History   (+) alcohol use ( occasional),   (-) drug use     OB/GYN negative ob/gyn ROS         Other   arthritis,      ROS/Med Hx Other: ECG 12 Lead  Order: [id number]  Status:  Final result   Visible to patient:  Yes (MyChart) Next appt:  05/24/2021 at 10:45 AM in Obstetrics and Gynecology (Mylene Davis MD)    Narrative & Impression      RR Interval= 714 ms  CO Interval= 160 ms  QRSD Interval= 142 ms  QT Interval= 396 ms  QTc Interval= 469 ms  Heart Rate= 84 ms  P Axis= 8 deg  QRS Axis= -11 deg  T Wave Axis= -30 deg  I: 40 Axis= -14 deg  T: 40 Axis= 141 deg  ST Axis= -25 deg  SINUS RHYTHM  RIGHT BUNDLE BRANCH BLOCK  NO SIGNIFICANT CHANGE FROM PREVIOUS ECG  Electronically Signed by:  Raulito Stubbs ([facility]) 21-Jun-2018 08:46:14  Date and Time of Study: 2018-06-21 06:55:21    Specimen Collected: 06/21/18 06:25                            Anesthesia Plan    ASA 3     MAC     intravenous  induction     Anesthetic plan, all risks, benefits, and alternatives have been provided, discussed and informed consent has been obtained with: patient.  Use of blood products discussed with patient  Consented to blood products.   Plan discussed with CRNA.

## 2021-04-19 NOTE — BRIEF OP NOTE
COLONOSCOPY  Progress Note    Nadia GIVENS Nicolle  4/19/2021    Pre-op Diagnosis:   Encounter for screening for malignant neoplasm of colon [Z12.11]  Personal history of colonic polyps [Z86.010]  Family history of colon cancer in mother [Z80.0]       Post-Op Diagnosis Codes:     * Encounter for screening for malignant neoplasm of colon [Z12.11]     * Personal history of colonic polyps [Z86.010]     * Family history of colon cancer in mother [Z80.0]     * Diverticulosis large intestine w/o perforation or abscess w/bleeding [K57.31]    Procedure/CPT® Codes:        Procedure(s):  COLONOSCOPY    Surgeon(s):  Simeon Sheriff MD    Anesthesia: Monitored Anesthesia Care    Staff:   Circulator: Carline Vasquez RN  Scrub Person: Courtney Brower         Estimated Blood Loss: none    Urine Voided: * No values recorded between 4/19/2021  9:06 AM and 4/19/2021  9:34 AM *    Specimens:                None          Drains: * No LDAs found *    Findings: Colon to TI good Prep  Sigmoid Diverticulosis    Complications: none          Simeon Sheriff MD     Date: 4/19/2021  Time: 09:34 EDT

## 2021-04-19 NOTE — H&P
Patient Care Team:  Paula Bates APRN as PCP - General (Family Medicine)    CHIEF COMPLAINT: Personal hx colon polyps    HISTORY OF PRESENT ILLNESS:  Last exam was 2015    Past Medical History:   Diagnosis Date   • Abnormal Pap smear of cervix    • Arthritis    • At risk for sleep apnea     STOP BANG 5   • Cervical dysplasia     s/p LEEP   • Colon polyps    • Cystocele with rectocele    • Diverticulitis    • Dyspepsia    • Gastritis    • GERD (gastroesophageal reflux disease)    • Hearing loss    • Heartburn    • Hemorrhoids    • History of cervical dysplasia    • History of hypertension     OFF MED SINCE 1/2018   • Hypertension     STOPPED MEDS--1/2018.   • Hypothyroidism    • Indigestion    • Joint pain    • Mastodynia    • Migraine    • Ovarian cyst 11/2015    RIGHT   • Primary osteoarthritis of right knee    • Reflux esophagitis    • Slow to wake up after anesthesia    • Tinnitus     RT EAR   • Urinary tract infection     recurrent UTIs a few years ago   • Uterine prolapse    • Varicose veins of bilateral lower extremities with other complications      Past Surgical History:   Procedure Laterality Date   • CERVICAL BIOPSY  W/ LOOP ELECTRODE EXCISION     • COLONOSCOPY W/ POLYPECTOMY N/A 12/04/2015    SIGMOID DIVERTICULOSIS, 1 TUBULAR ADENOMA COLON POLYP, RESCOPE IN 5 YRS, DR. GENARO CONN   • D&C HYSTEROSCOPY N/A    • DILATATION AND CURETTAGE      DUB   • ENDOSCOPY N/A 4/29/2016    NODULAR GASTRITIS, REFLUX ESOPHAGITIS, BX:BENIGN, DR. GENARO CONN AT [facility]   • HEMORRHOIDECTOMY N/A 6/21/2018    Procedure: HEMORRHOIDECTOMY x3;  Surgeon: Valery Alanis MD;  Location: [facility];  Service: General   • TUBAL ABDOMINAL LIGATION Bilateral    • VEIN LIGATION AND STRIPPING Left      Family History   Problem Relation Age of Onset   • Heart disease Father    • Diabetes Mother    • Colon cancer Mother 75   • Colon polyps Mother    • Hypertension Sister    • Colon polyps Sister    • Deep vein thrombosis  Sister    • Pulmonary embolism Sister    • Hypertension Sister    • Malig Hyperthermia Neg Hx    • Breast cancer Neg Hx    • Ovarian cancer Neg Hx      Social History     Tobacco Use   • Smoking status: Former Smoker     Years: 4.00     Types: Cigarettes   • Smokeless tobacco: Never Used   • Tobacco comment: QUIT 39 YEARS AGO   Substance Use Topics   • Alcohol use: Yes     Comment: occasionally   • Drug use: No     Medications Prior to Admission   Medication Sig Dispense Refill Last Dose   • calcium carbonate (TUMS) 500 MG chewable tablet Chew 1 tablet Daily.      • clindamycin (CLINDAGEL) 1 % gel       • Ibuprofen (ADVIL) 200 MG capsule Take 200 mg by mouth Take As Directed.      • levothyroxine (Synthroid) 50 MCG tablet Synthroid 50 mcg tablet   Take 1 tablet every day by oral route.      • meloxicam (MOBIC) 15 MG tablet meloxicam 15 mg tablet   Take 1 tablet every day by oral route.      • phentermine (ADIPEX-P) 37.5 MG tablet phentermine 37.5 mg tablet   Take 1 tablet every day by oral route.        Allergies:  Levofloxacin    REVIEW OF SYSTEMS:  Please see the above history of present illness for pertinent positives and negatives.  The remainder of the patient's systems have been reviewed and are negative.     Vital Signs  Temp:  [97.7 °F (36.5 °C)] 97.7 °F (36.5 °C)    Flowsheet Rows      First Filed Value   Admission Height  --   Admission Weight  98 kg (216 lb) Documented at 04/19/2021 0829           Physical Exam:  Physical Exam   Constitutional: Patient appears well-developed and well-nourished and in no acute distress   HEENT:   Head: Normocephalic and atraumatic.   Eyes:  Pupils are equal, round, and reactive to light. EOM are intact. Sclerae are anicteric and non-injected.  Mouth and Throat: Patient has moist mucous membranes. Oropharynx is clear of any erythema or exudate.     Neck: Neck supple. No JVD present. No thyromegaly present. No lymphadenopathy present.  Cardiovascular: Regular rate, regular  rhythm, S1 normal and S2 normal.  Exam reveals no gallop and no friction rub.  No murmur heard.  Pulmonary/Chest: Lungs are clear to auscultation bilaterally. No respiratory distress. No wheezes. No rhonchi. No rales.   Abdominal: Soft. Bowel sounds are normal. No distension and no mass. There is no hepatosplenomegaly. There is no tenderness.   Musculoskeletal: Normal Muscle tone  Extremities: No edema. Pulses are palpable in all 4 extremities.  Neurological: Patient is alert and oriented to person, place, and time. Cranial nerves II-XII are grossly intact with no focal deficits.  Skin: Skin is warm. No rash noted. Nails show no clubbing.  No cyanosis or erythema.    Debilities/Disabilities Identified: None  Emotional Behavior: Appropriate     Results Review:    I reviewed the patient's new clinical results.  Lab Results (most recent)     None          Imaging Results (Most Recent)     None        reviewed    ECG/EMG Results (most recent)     None        reviewed    Assessment/Plan   Personal hx colon polyps/  colonoscopy      I discussed the patients findings and my recommendations with patient.     Simeon Sheriff MD  04/19/21  08:31 EDT    Time: 10 min prior to procedure.

## 2021-04-19 NOTE — ANESTHESIA POSTPROCEDURE EVALUATION
Patient: Nadia Valverde    Procedure Summary     Date: 04/19/21 Room / Location: [facility] ENDOSCOPY 1 /  [facility] OR    Anesthesia Start: 0908 Anesthesia Stop: 0934    Procedure: COLONOSCOPY (N/A ) Diagnosis:       Encounter for screening for malignant neoplasm of colon      Personal history of colonic polyps      Family history of colon cancer in mother      Diverticulosis large intestine w/o perforation or abscess w/bleeding      (Encounter for screening for malignant neoplasm of colon [Z12.11])      (Personal history of colonic polyps [Z86.010])      (Family history of colon cancer in mother [Z80.0])    Surgeons: Simeon Sheriff MD Provider: Sav Valdes CRNA    Anesthesia Type: MAC ASA Status: 3          Anesthesia Type: MAC    Vitals  Vitals Value Taken Time   /86 04/19/21 0950   Temp 97.8 °F (36.6 °C) 04/19/21 0934   Pulse 71 04/19/21 0950   Resp 18 04/19/21 0950   SpO2 96 % 04/19/21 0950           Post Anesthesia Care and Evaluation    Patient location during evaluation: PHASE II  Patient participation: complete - patient participated  Level of consciousness: awake  Pain score: 0  Pain management: adequate  Airway patency: patent  Anesthetic complications: No anesthetic complications  PONV Status: none  Cardiovascular status: acceptable  Respiratory status: acceptable  Hydration status: acceptable

## 2021-04-19 NOTE — OP NOTE
COLONOSCOPY  Procedure Report    Patient Name:  Nadia Valverde  YOB: 1955    Date of Surgery:  4/19/2021     Indications:  Encounter for screening for malignant neoplasm of colon [Z12.11]  Personal history of colonic polyps [Z86.010]  Family history of colon cancer in mother [Z80.0]      Pre-op Diagnosis:   Encounter for screening for malignant neoplasm of colon [Z12.11]  Personal history of colonic polyps [Z86.010]  Family history of colon cancer in mother [Z80.0]    Post-Op Diagnosis Codes:     * Encounter for screening for malignant neoplasm of colon [Z12.11]     * Personal history of colonic polyps [Z86.010]     * Family history of colon cancer in mother [Z80.0]     * Diverticulosis large intestine w/o perforation or abscess w/bleeding [K57.31]         Procedure/CPT® Codes:      Procedure(s):  COLONOSCOPY    Staff:  Surgeon(s):  Simeon Sheriff MD         Anesthesia: Monitored Anesthesia Care    Estimated Blood Loss: none    Specimens: None    Implants:    Nothing was implanted during the procedure      Description of Procedure: After having signed informed consent, she was brought to the endoscopy suite and placed in the left lateral decubitus position and given her IV sedation. Rectal exam revealed no external lesions, normal anal tone, and no rectal mass. The scope was introduced into the rectum and advanced under direct visualization with ease through the rectum into the sigmoid colon, and past scattered diverticula, through the descending colon, to and around the splenic flexure, reduced and advanced through the transverse colon with some looping, to and around the hepatic flexure. The scope was reduced into the ascending colon and then advanced into the cecum. The cecum was identified by the appendiceal orifice and the ileocecal valve. It was well prepped and normal appearing. The ileocecal valve was briefly intubated and the terminal ileum was normal appearing. The scope was  withdrawn back into the ascending colon and withdrawn slowly examining the colon in a circumferential fashion. There were no mucosal lesions noted throughout the entire exam. The diverticula were limited to the sigmoid colon.  Within the rectum the scope was retroflexed revealing an intact dentate line and scars from previous hemorrhoidectomy but no mucosal lesions. The scope was deretroflexed and withdrawn. The patient tolerated the procedure very well.             Findings:  Colon to TI good Prep  Sigmoid Diverticulosis         Complications: None    Recommendations: Repeat in 5 years      Simeon Sheriff MD     Date: 4/19/2021  Time: 09:34 EDT

## 2021-05-12 ENCOUNTER — TELEPHONE (OUTPATIENT)
Dept: GASTROENTEROLOGY | Facility: CLINIC | Age: 66
End: 2021-05-12

## 2021-05-12 RX ORDER — HYDROCORTISONE ACETATE 25 MG/1
25 SUPPOSITORY RECTAL 2 TIMES DAILY
Qty: 12 SUPPOSITORY | Refills: 11 | Status: SHIPPED | OUTPATIENT
Start: 2021-05-12

## 2021-05-12 NOTE — TELEPHONE ENCOUNTER
Patient is having a hemorrhoid flare since her colonoscopy and is requesting Anusol suppositories. Patient states you have prescribed this for her in the past.

## 2021-05-12 NOTE — TELEPHONE ENCOUNTER
Informed pt that medication was sent in to pharmacy. She will let us know if not covered by Insurance or if too expensive.

## 2021-08-05 ENCOUNTER — OFFICE VISIT (OUTPATIENT)
Dept: OBSTETRICS AND GYNECOLOGY | Facility: CLINIC | Age: 66
End: 2021-08-05

## 2021-08-05 VITALS
BODY MASS INDEX: 44.17 KG/M2 | SYSTOLIC BLOOD PRESSURE: 124 MMHG | DIASTOLIC BLOOD PRESSURE: 68 MMHG | HEIGHT: 59 IN | WEIGHT: 219.1 LBS

## 2021-08-05 DIAGNOSIS — Z13.9 SCREENING FOR CONDITION: ICD-10-CM

## 2021-08-05 DIAGNOSIS — Z12.31 ENCOUNTER FOR SCREENING MAMMOGRAM FOR MALIGNANT NEOPLASM OF BREAST: ICD-10-CM

## 2021-08-05 DIAGNOSIS — Z01.419 ROUTINE GYNECOLOGICAL EXAMINATION: Primary | ICD-10-CM

## 2021-08-05 DIAGNOSIS — N81.4 UTERINE PROLAPSE: ICD-10-CM

## 2021-08-05 DIAGNOSIS — Z01.419 PAP SMEAR, LOW-RISK: ICD-10-CM

## 2021-08-05 DIAGNOSIS — Z11.51 ENCOUNTER FOR SCREENING FOR HUMAN PAPILLOMAVIRUS (HPV): ICD-10-CM

## 2021-08-05 PROBLEM — K64.8 INTERNAL HEMORRHOIDS WITH COMPLICATION: Status: RESOLVED | Noted: 2018-05-01 | Resolved: 2021-08-05

## 2021-08-05 LAB
BILIRUB BLD-MCNC: NEGATIVE MG/DL
CLARITY, POC: CLEAR
COLOR UR: YELLOW
GLUCOSE UR STRIP-MCNC: NEGATIVE MG/DL
KETONES UR QL: NEGATIVE
LEUKOCYTE EST, POC: NEGATIVE
NITRITE UR-MCNC: NEGATIVE MG/ML
PH UR: 5 [PH] (ref 5–8)
PROT UR STRIP-MCNC: NEGATIVE MG/DL
RBC # UR STRIP: NEGATIVE /UL
SP GR UR: 1 (ref 1–1.03)
UROBILINOGEN UR QL: NORMAL

## 2021-08-05 PROCEDURE — 81002 URINALYSIS NONAUTO W/O SCOPE: CPT | Performed by: OBSTETRICS & GYNECOLOGY

## 2021-08-05 PROCEDURE — G0101 CA SCREEN;PELVIC/BREAST EXAM: HCPCS | Performed by: OBSTETRICS & GYNECOLOGY

## 2021-08-05 RX ORDER — PHENTERMINE AND TOPIRAMATE 15; 92 MG/1; MG/1
CAPSULE, EXTENDED RELEASE ORAL
COMMUNITY
End: 2022-07-17

## 2021-08-05 RX ORDER — MECLIZINE HYDROCHLORIDE 25 MG/1
TABLET ORAL
COMMUNITY

## 2021-08-05 NOTE — PROGRESS NOTES
GYN Annual Exam     CC- Here for annual exam.     Nadia Valverde is a 65 y.o. female est pt who presents for annual well woman exam. She underwent menopause at age 50 and is not on any HRT. No  VB.  No bladder issues. She says her pelvic prolapse is stable.     OB History        2    Para   2    Term   2            AB        Living   2       SAB        TAB        Ectopic        Molar        Multiple        Live Births              Obstetric Comments   2              Menarche:14  Menopause:50  HRT:none  Current contraception: post menopausal status and tubal ligation  History of abnormal Pap smear: yes -  s/p LEEP  History of abnormal mammogram: no  Family history of uterine, colon or ovarian cancer: yes - mom colon cancer age 75  Family history of breast cancer: no  STD's: none  Last pap smear: 2019- nl pap/HPV  JOSEPH: sister DVT and PE  GRade 3 C/U       Health Maintenance   Topic Date Due   • TDAP/TD VACCINES (1 - Tdap) Never done   • HEPATITIS C SCREENING  Never done   • ANNUAL WELLNESS VISIT  Never done   • Pneumococcal Vaccine 65+ (1 of 1 - PPSV23) Never done   • DXA SCAN  01/15/2021   • INFLUENZA VACCINE  10/01/2021   • PAP SMEAR  2022   • MAMMOGRAM  2023   • COLORECTAL CANCER SCREENING  2026   • COVID-19 Vaccine  Completed   • ZOSTER VACCINE  Completed       Past Medical History:   Diagnosis Date   • Abnormal Pap smear of cervix    • Arthritis    • At risk for sleep apnea     STOP BANG 5   • Cervical dysplasia     s/p LEEP   • Colon polyps    • Cystocele with rectocele    • Diverticulitis    • Dyspepsia    • Gastritis    • GERD (gastroesophageal reflux disease)    • Hearing loss    • Heartburn    • Hemorrhoids    • History of cervical dysplasia    • History of hypertension     OFF MED SINCE 2018   • Hypertension    • Hypothyroidism    • Indigestion    • Joint pain    • Mastodynia    • Migraine    • Ovarian cyst 2015    RIGHT   • Primary osteoarthritis of right knee     • Reflux esophagitis    • Slow to wake up after anesthesia    • Tinnitus     RT EAR   • Urinary tract infection     recurrent UTIs a few years ago   • Uterine prolapse    • Varicose veins of bilateral lower extremities with other complications        Past Surgical History:   Procedure Laterality Date   • CERVICAL BIOPSY  W/ LOOP ELECTRODE EXCISION     • COLONOSCOPY N/A 4/19/2021    Procedure: COLONOSCOPY;  Surgeon: Simeon Conn MD;  Location: [facility] OR;  Service: Gastroenterology;  Laterality: N/A;  CECAL TIME AT 0922  DIVERTICULOSIS   • COLONOSCOPY W/ POLYPECTOMY N/A 12/04/2015    SIGMOID DIVERTICULOSIS, 1 TUBULAR ADENOMA COLON POLYP, RESCOPE IN 5 YRS, DR. SIMEON CONN   • D & C HYSTEROSCOPY N/A    • DILATATION AND CURETTAGE      DUB   • ENDOSCOPY N/A 4/29/2016    NODULAR GASTRITIS, REFLUX ESOPHAGITIS, BX:BENIGN, DR. SIMEON CONN AT [facility]   • HEMORRHOIDECTOMY N/A 6/21/2018    Procedure: HEMORRHOIDECTOMY x3;  Surgeon: Valery Alanis MD;  Location: [facility] OR;  Service: General   • TUBAL ABDOMINAL LIGATION Bilateral    • VEIN LIGATION AND STRIPPING Left          Current Outpatient Medications:   •  calcium carbonate (TUMS) 500 MG chewable tablet, Chew 1 tablet Daily., Disp: , Rfl:   •  clindamycin (CLINDAGEL) 1 % gel, , Disp: , Rfl:   •  hydrocortisone (ANUSOL-HC) 25 MG suppository, Insert 1 suppository into the rectum 2 (Two) Times a Day., Disp: 12 suppository, Rfl: 11  •  levothyroxine (Synthroid) 50 MCG tablet, Synthroid 50 mcg tablet  Take 1 tablet every day by oral route., Disp: , Rfl:   •  meclizine (ANTIVERT) 25 MG tablet, meclizine 25 mg tablet  Take 1 tablet 3 times a day by oral route as needed., Disp: , Rfl:   •  meloxicam (MOBIC) 15 MG tablet, meloxicam 15 mg tablet  Take 1 tablet every day by oral route., Disp: , Rfl:   •  Phentermine-Topiramate (Qsymia) 15-92 MG capsule sustained-release 24 hr, Qsymia 15 mg-92 mg capsule, extended release  Take 1 capsule every day by oral  "route., Disp: , Rfl:     Allergies   Allergen Reactions   • Levofloxacin Myalgia     Lower extremities  Other reaction(s): Arthralgia (Joint Pain)  Other reaction(s): Arthralgia (joint pain)       Social History     Tobacco Use   • Smoking status: Former Smoker     Years: 4.00     Types: Cigarettes   • Smokeless tobacco: Never Used   • Tobacco comment: QUIT 39 YEARS AGO   Substance Use Topics   • Alcohol use: Yes     Comment: occasionally   • Drug use: No       Family History   Problem Relation Age of Onset   • Heart disease Father    • Diabetes Mother    • Colon cancer Mother 75   • Colon polyps Mother    • Hypertension Sister    • Colon polyps Sister    • Deep vein thrombosis Sister    • Pulmonary embolism Sister    • Hypertension Sister    • Malig Hyperthermia Neg Hx    • Breast cancer Neg Hx    • Ovarian cancer Neg Hx        Review of Systems   Constitutional: Positive for activity change (pandemic) and unexpected weight change (gain). Negative for appetite change, fatigue and fever.   Respiratory: Negative for cough and shortness of breath.    Cardiovascular: Negative for chest pain and palpitations.   Gastrointestinal: Negative for abdominal distention, abdominal pain, constipation, diarrhea and nausea.   Endocrine: Negative for cold intolerance and heat intolerance.   Genitourinary: Negative for dyspareunia, dysuria, menstrual problem, pelvic pain, vaginal discharge and vaginal pain.   Skin: Negative for color change and rash.   Neurological: Negative for headaches.   Hematological: Negative for adenopathy. Does not bruise/bleed easily.   Psychiatric/Behavioral: Negative for dysphoric mood. The patient is not nervous/anxious.        /68   Ht 149.9 cm (59\")   Wt 99.4 kg (219 lb 1.6 oz)   LMP  (LMP Unknown)   Breastfeeding No   BMI 44.25 kg/m²     Physical Exam   Constitutional: She is oriented to person, place, and time. She appears well-developed.   HENT:   Head: Normocephalic and atraumatic. "   Eyes: Conjunctivae are normal. No scleral icterus.   Neck: No thyromegaly present.   Cardiovascular: Normal rate and regular rhythm.   Pulmonary/Chest: Effort normal and breath sounds normal. Right breast exhibits no inverted nipple, no mass, no nipple discharge, no skin change and no tenderness. Left breast exhibits no inverted nipple, no mass, no nipple discharge, no skin change and no tenderness.   Large, pendulous breasts   Abdominal: Soft. Normal appearance and bowel sounds are normal. She exhibits no distension and no mass. There is no abdominal tenderness. There is no rebound and no guarding. No hernia.   Genitourinary:    Rectum normal.      Pelvic exam was performed with patient supine.   There is no rash, tenderness or lesion on the right labia. There is no rash, tenderness or lesion on the left labia. Uterus is not deviated, not enlarged, not fixed and not tender. Cervix exhibits no motion tenderness, no discharge and no friability. Right adnexum displays no mass, no tenderness and no fullness. Left adnexum displays no mass, no tenderness and no fullness.    No vaginal discharge, erythema, tenderness or bleeding.   No erythema, tenderness or bleeding in the vagina.    No foreign body in the vagina.      No signs of injury in the vagina.      Genitourinary Comments: Grade 3 cystocele and uterine prolapse  Exam limited due to habitus     Neurological: She is alert and oriented to person, place, and time.   Skin: Skin is warm and dry.   Psychiatric: Her behavior is normal. Mood, judgment and thought content normal.   Nursing note and vitals reviewed.         Assessment/Plan    1) GYN HM: normal pap/HPV, check pap.  1/2019 SBE demonstrated and encouraged.  2) STD screening: declines Condoms encouraged.  3) Bone health - Weight bearing exercise, dietary calcium recommendations and vitamin D reviewed.   4) Diet and Exercise discussed  5) Smoking Status: No   6) Social: no issues  7)MMG:  UTD 3/2021- B1.  Repeat MMG 3/2022  8) DEXA-UTD 1/2019- normal, repeat 5 years  9)C scope- UTD 4/2016, repeat 5 years  10) POP- stable on exam, pt declines treatment at present.  11) Parts of this document have been copied or forwarded from her previous visits and have been reviewed, updated and edited as indicated.   12)I saw the patient with a face mask, gloves and eye protection  The patient herself was masked.  Social distancing was observed as appropriate.  13)Follow up prn and 2 years annual       Diagnoses and all orders for this visit:    1. Routine gynecological examination (Primary)  -     POC Urinalysis Dipstick    2. Pap smear, low-risk  -     Pap IG (Image Guided)    3. Encounter for screening for human papillomavirus (HPV)  -     Pap IG (Image Guided)    4. Screening for condition  -     Mammo Screening Bilateral With CAD; Future    5. Encounter for screening mammogram for malignant neoplasm of breast   -     Mammo Screening Bilateral With CAD; Future    6. Uterine prolapse        Mylene Davis MD  014:51 EDT  8/5/2021

## 2021-08-09 LAB
CYTOLOGIST CVX/VAG CYTO: ABNORMAL
CYTOLOGY CVX/VAG DOC CYTO: ABNORMAL
CYTOLOGY CVX/VAG DOC THIN PREP: ABNORMAL
DX ICD CODE: ABNORMAL
DX ICD CODE: ABNORMAL
HIV 1 & 2 AB SER-IMP: ABNORMAL
OTHER STN SPEC: ABNORMAL
PATHOLOGIST CVX/VAG CYTO: ABNORMAL
STAT OF ADQ CVX/VAG CYTO-IMP: ABNORMAL

## 2021-08-10 NOTE — PROGRESS NOTES
PIP= pt has atypical cells on pap ( ASCUS), she can either pay to have HPV typing run or she can come in for colpo.

## 2021-08-30 ENCOUNTER — OFFICE VISIT (OUTPATIENT)
Dept: OBSTETRICS AND GYNECOLOGY | Facility: CLINIC | Age: 66
End: 2021-08-30

## 2021-08-30 VITALS
DIASTOLIC BLOOD PRESSURE: 88 MMHG | WEIGHT: 218.5 LBS | SYSTOLIC BLOOD PRESSURE: 124 MMHG | BODY MASS INDEX: 44.05 KG/M2 | HEIGHT: 59 IN

## 2021-08-30 DIAGNOSIS — Z01.419 ROUTINE GYNECOLOGICAL EXAMINATION: ICD-10-CM

## 2021-08-30 DIAGNOSIS — R87.619 ABNORMAL CERVICAL PAPANICOLAOU SMEAR, UNSPECIFIED ABNORMAL PAP FINDING: Primary | ICD-10-CM

## 2021-08-30 LAB
B-HCG UR QL: NEGATIVE
BILIRUB BLD-MCNC: NEGATIVE MG/DL
CLARITY, POC: CLEAR
COLOR UR: YELLOW
GLUCOSE UR STRIP-MCNC: NEGATIVE MG/DL
INTERNAL NEGATIVE CONTROL: NEGATIVE
INTERNAL POSITIVE CONTROL: POSITIVE
KETONES UR QL: NEGATIVE
LEUKOCYTE EST, POC: NEGATIVE
Lab: 55
NITRITE UR-MCNC: NEGATIVE MG/ML
PH UR: 5 [PH] (ref 5–8)
PROT UR STRIP-MCNC: NEGATIVE MG/DL
RBC # UR STRIP: NEGATIVE /UL
SP GR UR: 1 (ref 1–1.03)
UROBILINOGEN UR QL: NORMAL

## 2021-08-30 PROCEDURE — 81002 URINALYSIS NONAUTO W/O SCOPE: CPT | Performed by: OBSTETRICS & GYNECOLOGY

## 2021-08-30 PROCEDURE — 57454 BX/CURETT OF CERVIX W/SCOPE: CPT | Performed by: OBSTETRICS & GYNECOLOGY

## 2021-08-30 PROCEDURE — 81025 URINE PREGNANCY TEST: CPT | Performed by: OBSTETRICS & GYNECOLOGY

## 2021-08-30 NOTE — PROGRESS NOTES
Colposcopy Procedure Note    Procedures          Indications: Most recent Pap smear showed: ASCUS, pt did not want to pay for HPV typing.      Prior cervical/vaginal disease: distant h/o LEEP  Prior cervical treatment: LLETZ.  Contraception: post menopausal status and tubal ligation    Procedure Details   The risks and benefits of the procedure and Verbal informed consent obtained.  Pregnancy test: not done    Speculum placed in vagina and excellent visualization of cervix achieved, cervix swabbed x 3 with acetic acid solution and Lugol's solution     Findings:  Cervix: no mosaicism, no punctation, no abnormal vasculature and acetowhite lesion(s) noted at 12 & 7 o'clock; cervix swabbed with Lugol's solution, endocervical curettage performed, cervical biopsies taken at 12, 7. ECC o'clock, specimen labelled and sent to pathology and hemostasis achieved with Monsel's solution.  Vaginal inspection: normal without visible lesions.  Vulvar colposcopy: vulvar colposcopy not performed.  Colpo adequacy: was not adequate, on 50% of TZ seen    Specimens: 12,7, ECC    Colpo impression: RIZWAN 1    Complications: none.   Patient tolerated procedure well.     Smoking Status: No      Plan:  Specimens labelled and sent to Pathology.  Will base further treatment on Pathology findings.  Treatment options discussed with patient.  Post biopsy instructions given to patient.  If her pap remains abnormal in 6 months, pt is agreeable to adding HPV typing.     Mylene Davis MD   8/30/2021  16:01 EDT

## 2021-09-01 LAB
DX ICD CODE: NORMAL
DX ICD CODE: NORMAL
PATH REPORT.FINAL DX SPEC: NORMAL
PATH REPORT.GROSS SPEC: NORMAL
PATH REPORT.SITE OF ORIGIN SPEC: NORMAL
PATHOLOGIST NAME: NORMAL
PAYMENT PROCEDURE: NORMAL

## 2022-02-28 ENCOUNTER — OFFICE VISIT (OUTPATIENT)
Dept: OBSTETRICS AND GYNECOLOGY | Facility: CLINIC | Age: 67
End: 2022-02-28

## 2022-02-28 VITALS
SYSTOLIC BLOOD PRESSURE: 140 MMHG | WEIGHT: 209 LBS | HEIGHT: 59 IN | BODY MASS INDEX: 42.13 KG/M2 | DIASTOLIC BLOOD PRESSURE: 96 MMHG

## 2022-02-28 DIAGNOSIS — Z01.419 PAP SMEAR, LOW-RISK: ICD-10-CM

## 2022-02-28 DIAGNOSIS — R87.610 ATYPICAL SQUAMOUS CELLS OF UNDETERMINED SIGNIFICANCE (ASCUS) ON PAPANICOLAOU SMEAR OF CERVIX: ICD-10-CM

## 2022-02-28 DIAGNOSIS — Z13.89 SCREENING FOR GENITOURINARY CONDITION: Primary | ICD-10-CM

## 2022-02-28 LAB
BILIRUB BLD-MCNC: NEGATIVE MG/DL
CLARITY, POC: CLEAR
COLOR UR: YELLOW
GLUCOSE UR STRIP-MCNC: NEGATIVE MG/DL
KETONES UR QL: NEGATIVE
LEUKOCYTE EST, POC: NEGATIVE
NITRITE UR-MCNC: NEGATIVE MG/ML
PH UR: 5 [PH] (ref 5–8)
PROT UR STRIP-MCNC: NEGATIVE MG/DL
RBC # UR STRIP: NEGATIVE /UL
SP GR UR: 1.02 (ref 1–1.03)
UROBILINOGEN UR QL: NORMAL

## 2022-02-28 PROCEDURE — 81002 URINALYSIS NONAUTO W/O SCOPE: CPT | Performed by: OBSTETRICS & GYNECOLOGY

## 2022-02-28 PROCEDURE — 99213 OFFICE O/P EST LOW 20 MIN: CPT | Performed by: OBSTETRICS & GYNECOLOGY

## 2022-02-28 RX ORDER — PENCICLOVIR 1 %
CREAM (GRAM) TOPICAL
COMMUNITY

## 2022-02-28 NOTE — PROGRESS NOTES
"      Nadia Valverde is a 66 y.o. patient who presents for follow up of   Chief Complaint   Patient presents with   • Follow-up     6 month repeat pap     65 yo est pt here for 6 month repeat pap smear. She had an ASCUS pap in 8/2021 and did not want to pay for HPV typing so we did a colpo that showed \" mild HPV effect\" . This is her first 6 month repeat pap and we discussed that if her pap continues to be abnormal we should proceed with a CKC in the OR.         The following portions of the patient's history were reviewed and updated as appropriate: allergies, current medications and problem list.    Review of Systems   Genitourinary: Negative for pelvic pain, vaginal bleeding and vaginal pain.       /96   Ht 149.9 cm (59\")   Wt 94.8 kg (209 lb)   LMP  (LMP Unknown)   BMI 42.21 kg/m²     Physical Exam  Vitals and nursing note reviewed. Exam conducted with a chaperone present.   Constitutional:       Appearance: Normal appearance. She is well-developed. She is obese.   HENT:      Head: Normocephalic and atraumatic.   Eyes:      General: No scleral icterus.     Conjunctiva/sclera: Conjunctivae normal.   Neck:      Thyroid: No thyromegaly.   Genitourinary:     General: Normal vulva.      Vagina: Prolapsed vaginal walls present.      Cervix: Normal.      Uterus: With uterine prolapse.       Adnexa: Right adnexa normal and left adnexa normal.      Comments: Grade 3 C/R/U  Skin:     General: Skin is warm and dry.   Neurological:      Mental Status: She is alert and oriented to person, place, and time.   Psychiatric:         Mood and Affect: Mood normal.         Behavior: Behavior normal.         Thought Content: Thought content normal.         Judgment: Judgment normal.         A/P:  1. H/O ASCUS pap, no HPV done- s/p colpo in 8/2021. First 6 month repeat pap today. Will call with results and followup.     Assessment/Plan   Diagnoses and all orders for this visit:    1. Screening for genitourinary condition " (Primary)  -     POC Urinalysis Dipstick    2. Pap smear, low-risk  -     Pap IG (Image Guided)    3. Atypical squamous cells of undetermined significance (ASCUS) on Papanicolaou smear of cervix                 No follow-ups on file.      Mylene Davis MD    2/28/2022  09:39 EST

## 2022-03-03 ENCOUNTER — HOSPITAL ENCOUNTER (OUTPATIENT)
Dept: MAMMOGRAPHY | Facility: HOSPITAL | Age: 67
Discharge: HOME | End: 2022-03-03
Admitting: OBSTETRICS & GYNECOLOGY

## 2022-03-03 DIAGNOSIS — Z13.9 SCREENING FOR CONDITION: ICD-10-CM

## 2022-03-03 DIAGNOSIS — Z12.31 ENCOUNTER FOR SCREENING MAMMOGRAM FOR MALIGNANT NEOPLASM OF BREAST: ICD-10-CM

## 2022-03-03 PROCEDURE — 77067 SCR MAMMO BI INCL CAD: CPT

## 2022-03-03 PROCEDURE — 77063 BREAST TOMOSYNTHESIS BI: CPT

## 2022-03-08 LAB
CYTOLOGIST CVX/VAG CYTO: NORMAL
CYTOLOGY CVX/VAG DOC CYTO: NORMAL
CYTOLOGY CVX/VAG DOC THIN PREP: NORMAL
DX ICD CODE: NORMAL
HIV 1 & 2 AB SER-IMP: NORMAL
OTHER STN SPEC: NORMAL
PATHOLOGIST CVX/VAG CYTO: NORMAL
STAT OF ADQ CVX/VAG CYTO-IMP: NORMAL

## 2022-04-20 ENCOUNTER — TRANSCRIBE ORDERS (OUTPATIENT)
Dept: ADMINISTRATIVE | Facility: HOSPITAL | Age: 67
End: 2022-04-20

## 2022-04-20 DIAGNOSIS — Z13.9 ENCOUNTER FOR SCREENING: Primary | ICD-10-CM

## 2022-09-13 ENCOUNTER — HOSPITAL ENCOUNTER (OUTPATIENT)
Dept: CARDIOLOGY | Facility: HOSPITAL | Age: 67
Discharge: HOME | End: 2022-09-13
Admitting: NURSE PRACTITIONER

## 2022-09-13 VITALS
BODY MASS INDEX: 41.53 KG/M2 | HEART RATE: 66 BPM | HEIGHT: 59 IN | WEIGHT: 206 LBS | SYSTOLIC BLOOD PRESSURE: 133 MMHG | DIASTOLIC BLOOD PRESSURE: 85 MMHG

## 2022-09-13 DIAGNOSIS — Z13.9 ENCOUNTER FOR SCREENING: ICD-10-CM

## 2022-09-13 LAB
BH CV ECHO MEAS - DIST AO DIAM: 1.51 CM
BH CV VAS BP LEFT ARM: NORMAL MMHG
BH CV VAS BP RIGHT ARM: NORMAL MMHG
BH CV XLRA MEAS - MID AO DIAM: 1.82 CM
BH CV XLRA MEAS - PAD LEFT ABI DP: 1.28
BH CV XLRA MEAS - PAD LEFT ABI PT: 1.34
BH CV XLRA MEAS - PAD LEFT ARM: 133 MMHG
BH CV XLRA MEAS - PAD LEFT LEG DP: 170 MMHG
BH CV XLRA MEAS - PAD LEFT LEG PT: 178 MMHG
BH CV XLRA MEAS - PAD RIGHT ABI DP: 1.25
BH CV XLRA MEAS - PAD RIGHT ABI PT: 1.3
BH CV XLRA MEAS - PAD RIGHT ARM: 131 MMHG
BH CV XLRA MEAS - PAD RIGHT LEG DP: 166 MMHG
BH CV XLRA MEAS - PAD RIGHT LEG PT: 173 MMHG
BH CV XLRA MEAS - PROX AO DIAM: 2.2 CM
BH CV XLRA MEAS LEFT ICA/CCA RATIO: 1.1
BH CV XLRA MEAS LEFT MID CCA PSV: NORMAL CM/SEC
BH CV XLRA MEAS LEFT MID ICA PSV: NORMAL CM/SEC
BH CV XLRA MEAS LEFT PROX ECA PSV: NORMAL CM/SEC
BH CV XLRA MEAS RIGHT ICA/CCA RATIO: 0.86
BH CV XLRA MEAS RIGHT MID CCA PSV: NORMAL CM/SEC
BH CV XLRA MEAS RIGHT MID ICA PSV: NORMAL CM/SEC
BH CV XLRA MEAS RIGHT PROX ECA PSV: NORMAL CM/SEC
MAXIMAL PREDICTED HEART RATE: 154 BPM
STRESS TARGET HR: 131 BPM

## 2022-09-13 PROCEDURE — 93799 UNLISTED CV SVC/PROCEDURE: CPT

## 2022-10-26 ENCOUNTER — OFFICE VISIT (OUTPATIENT)
Dept: GASTROENTEROLOGY | Facility: CLINIC | Age: 67
End: 2022-10-26

## 2022-10-26 VITALS
WEIGHT: 210.2 LBS | BODY MASS INDEX: 42.38 KG/M2 | SYSTOLIC BLOOD PRESSURE: 138 MMHG | DIASTOLIC BLOOD PRESSURE: 88 MMHG | HEIGHT: 59 IN

## 2022-10-26 DIAGNOSIS — K21.00 GASTROESOPHAGEAL REFLUX DISEASE WITH ESOPHAGITIS WITHOUT HEMORRHAGE: Primary | ICD-10-CM

## 2022-10-26 PROCEDURE — 99213 OFFICE O/P EST LOW 20 MIN: CPT

## 2022-10-26 RX ORDER — AMOXICILLIN AND CLAVULANATE POTASSIUM 875; 125 MG/1; MG/1
TABLET, FILM COATED ORAL
COMMUNITY
Start: 2022-10-18 | End: 2022-12-31

## 2022-10-26 RX ORDER — METAXALONE 800 MG/1
TABLET ORAL
COMMUNITY
Start: 2022-08-01 | End: 2022-12-31

## 2022-10-26 RX ORDER — ALBUTEROL SULFATE 90 UG/1
AEROSOL, METERED RESPIRATORY (INHALATION)
COMMUNITY
Start: 2022-08-01

## 2022-10-26 RX ORDER — PHENTERMINE HYDROCHLORIDE 37.5 MG/1
TABLET ORAL
COMMUNITY
Start: 2022-09-02 | End: 2022-12-31

## 2022-10-26 RX ORDER — OMEPRAZOLE 40 MG/1
40 CAPSULE, DELAYED RELEASE ORAL DAILY
Qty: 90 CAPSULE | Refills: 3 | Status: SHIPPED | OUTPATIENT
Start: 2022-10-26

## 2022-10-26 NOTE — PROGRESS NOTES
"    PATIENT INFORMATION  Nadia Valverde       - 1955    CHIEF COMPLAINT  Chief Complaint   Patient presents with   • Nausea   • Vomiting   • Abdominal Pain       HISTORY OF PRESENT ILLNESS  Here today for nausea, vomiting and abdominal pain. Lactose intolerance. Episode of vomiting in July. In August, another episode of abdominal pain and vomiting all night. Most recent episode last week, even with protein shake. Has been off PPI, at least a year. Treats HB with OTC tums which improves, only once a week. Has been on meloxicam for a few years but really only takes maybe once a month. Advil less than once a week for headaches. Neck pain since  last summer and much improvement with PT. No dysphagia, odynophagia, abdominal pain, abdominal pain.     2018 EGD with chemical gastropathy and reflux esophagitis, another in 2016 with the same path results.    21 last colon, tics and normal mucosa. Polyps in 2016.      REVIEWED PERTINENT RESULTS/ LABS  Lab Results   Component Value Date    CASEREPORT  2018     Surgical Pathology Report                         Case: [id number]                                  Authorizing Provider:  Valery Alanis MD         Collected:           2018 07:51 AM          Ordering Location:     [facility]  Received:            2018 10:07 AM                                 MAIN OR                                                                      Pathologist:           Johnny Davis MD                                                        Specimens:   1) - Hemorrhoid(s), right anterior hemorrhoid                                                       2) - Hemorrhoid(s), left lateral hemorrhoid                                                         3) - Hemorrhoid(s), right posterior hemorrhoid                                             FINALDX  2018     1: ANORECTAL MUCOSA, \"RIGHT ANTERIOR\", PARTIAL RESECTION SPECIMEN:   CHANGES " "CONSISTENT WITH A HEMORRHOID.   FOCAL REACTIVE MUCOSAL CHANGE.     2: ANORECTAL MUCOSA, \"LEFT LATERAL\", PARTIAL RESECTION SPECIMEN:   CHANGES CONSISTENT WITH HEMORRHOID.   SURFACE MUCOSAL EROSION/ULCER WITH ACUTE INFLAMMATION.    3: ANORECTAL MUCOSA, \"RIGHT POSTERIOR\", PARTIAL RESECTION SPECIMEN:   CHANGES CONSISTENT WITH HEMORRHOID.   SURFACE MUCOSAL EROSION/ULCER WITH ACUTE INFLAMMATION.      BUCK/gorge     CPT CODES:  1: 71646  2: 03996  3: 04079       Lab Results   Component Value Date    HGB 12.7 10/30/2019    MCV 87.6 10/30/2019     10/30/2019    ALT 12 10/30/2019    AST 15 10/30/2019      No results found.    REVIEW OF SYSTEMS  Review of Systems   Constitutional: Negative for activity change, chills, fever and unexpected weight change.   HENT: Negative for congestion.    Eyes: Negative for visual disturbance.   Respiratory: Negative for shortness of breath.    Cardiovascular: Negative for chest pain and palpitations.   Gastrointestinal: Positive for abdominal distention, abdominal pain, nausea and vomiting. Negative for blood in stool.   Endocrine: Negative for cold intolerance and heat intolerance.   Genitourinary: Negative for hematuria.   Musculoskeletal: Negative for gait problem.   Skin: Negative for color change.   Allergic/Immunologic: Negative for immunocompromised state.   Neurological: Negative for weakness and light-headedness.   Hematological: Negative for adenopathy.   Psychiatric/Behavioral: Negative for sleep disturbance. The patient is not nervous/anxious.          ACTIVE PROBLEMS  Patient Active Problem List    Diagnosis    • Gastroesophageal reflux disease with esophagitis without hemorrhage [K21.00]    • Encounter for screening for malignant neoplasm of colon [Z12.11]    • Personal history of colonic polyps [Z86.010]    • Family history of colon cancer in mother [Z80.0]    • Uterine prolapse [N81.4]    • Primary osteoarthritis of right knee [M17.11]    • Varicose veins of lower " extremities with other complications [I83.893]    • Family history of colon cancer [Z80.0]    • History of cervical dysplasia [Z87.410]    • Rectocele [N81.6]          PAST MEDICAL HISTORY  Past Medical History:   Diagnosis Date   • Abnormal Pap smear of cervix    • Arthritis    • At risk for sleep apnea     STOP BANG 5   • Cervical dysplasia     s/p LEEP   • Colon polyps    • Cystocele with rectocele    • Diverticulitis    • Dyspepsia    • Gastritis    • GERD (gastroesophageal reflux disease)    • Hearing loss    • Heartburn    • Hemorrhoids    • History of cervical dysplasia    • History of hypertension     OFF MED SINCE 1/2018   • Hypertension    • Hypothyroidism    • Indigestion    • Joint pain    • Mastodynia    • Migraine    • Ovarian cyst 11/2015    RIGHT   • Primary osteoarthritis of right knee    • Reflux esophagitis    • Slow to wake up after anesthesia    • Tinnitus     RT EAR   • Urinary tract infection     recurrent UTIs a few years ago   • Uterine prolapse    • Varicose veins of bilateral lower extremities with other complications          SURGICAL HISTORY  Past Surgical History:   Procedure Laterality Date   • CERVICAL BIOPSY  W/ LOOP ELECTRODE EXCISION     • COLONOSCOPY N/A 4/19/2021    Procedure: COLONOSCOPY;  Surgeon: Simeon Conn MD;  Location: [facility] OR;  Service: Gastroenterology;  Laterality: N/A;  CECAL TIME AT 0922  DIVERTICULOSIS   • COLONOSCOPY W/ POLYPECTOMY N/A 12/04/2015    SIGMOID DIVERTICULOSIS, 1 TUBULAR ADENOMA COLON POLYP, RESCOPE IN 5 YRS, DR. SIMEON CONN   • D & C HYSTEROSCOPY N/A    • DILATATION AND CURETTAGE      DUB   • ENDOSCOPY N/A 4/29/2016    NODULAR GASTRITIS, REFLUX ESOPHAGITIS, BX:BENIGN, DR. SIMEON CONN AT [facility]   • HEMORRHOIDECTOMY N/A 6/21/2018    Procedure: HEMORRHOIDECTOMY x3;  Surgeon: Valery Alanis MD;  Location: [facility] OR;  Service: General   • TUBAL ABDOMINAL LIGATION Bilateral    • VEIN LIGATION AND STRIPPING Left           FAMILY HISTORY  Family History   Problem Relation Age of Onset   • Heart disease Father    • Diabetes Mother    • Colon cancer Mother 75   • Colon polyps Mother    • Hypertension Sister    • Colon polyps Sister    • Deep vein thrombosis Sister    • Pulmonary embolism Sister    • Hypertension Sister    • Malig Hyperthermia Neg Hx    • Breast cancer Neg Hx    • Ovarian cancer Neg Hx          SOCIAL HISTORY  Social History     Occupational History   • Not on file   Tobacco Use   • Smoking status: Former     Years: 4.00     Types: Cigarettes   • Smokeless tobacco: Never   • Tobacco comments:     QUIT 39 YEARS AGO   Vaping Use   • Vaping Use: Never used   Substance and Sexual Activity   • Alcohol use: Yes     Comment: occasionally   • Drug use: No   • Sexual activity: Yes     Partners: Male     Birth control/protection: Surgical, Post-menopausal     Comment: BTL         CURRENT MEDICATIONS    Current Outpatient Medications:   •  albuterol sulfate  (90 Base) MCG/ACT inhaler, , Disp: , Rfl:   •  amoxicillin-clavulanate (AUGMENTIN) 875-125 MG per tablet, , Disp: , Rfl:   •  hydrocortisone (ANUSOL-HC) 25 MG suppository, Insert 1 suppository into the rectum 2 (Two) Times a Day., Disp: 12 suppository, Rfl: 11  •  levothyroxine (SYNTHROID, LEVOTHROID) 50 MCG tablet, Synthroid 50 mcg tablet  Take 1 tablet every day by oral route., Disp: , Rfl:   •  meclizine (ANTIVERT) 25 MG tablet, meclizine 25 mg tablet  Take 1 tablet 3 times a day by oral route as needed., Disp: , Rfl:   •  meloxicam (MOBIC) 15 MG tablet, meloxicam 15 mg tablet  Take 1 tablet every day by oral route., Disp: , Rfl:   •  metaxalone (SKELAXIN) 800 MG tablet, , Disp: , Rfl:   •  penciclovir (Denavir) 1 % cream, Denavir 1 % topical cream, Disp: , Rfl:   •  phentermine (ADIPEX-P) 37.5 MG tablet, , Disp: , Rfl:   •  omeprazole (priLOSEC) 40 MG capsule, Take 1 capsule by mouth Daily., Disp: 90 capsule, Rfl:  "3    ALLERGIES  Levofloxacin    VITALS  Vitals:    10/26/22 1501   BP: 138/88   BP Location: Left arm   Patient Position: Sitting   Cuff Size: Large Adult   Weight: 95.3 kg (210 lb 3.2 oz)   Height: 149.9 cm (59\")       PHYSICAL EXAM  Debilities/Disabilities Identified: None  Emotional Behavior: Appropriate  Wt Readings from Last 3 Encounters:   10/26/22 95.3 kg (210 lb 3.2 oz)   09/13/22 93.4 kg (206 lb)   07/17/22 94.3 kg (208 lb)     Ht Readings from Last 1 Encounters:   10/26/22 149.9 cm (59\")     Body mass index is 42.46 kg/m².  Physical Exam  Constitutional:       General: She is not in acute distress.     Appearance: Normal appearance. She is not ill-appearing.   HENT:      Head: Normocephalic and atraumatic.      Mouth/Throat:      Mouth: Mucous membranes are moist.      Pharynx: No posterior oropharyngeal erythema.   Eyes:      General: No scleral icterus.  Cardiovascular:      Rate and Rhythm: Normal rate and regular rhythm.      Pulses: Normal pulses.      Heart sounds: Normal heart sounds.   Pulmonary:      Effort: Pulmonary effort is normal.      Breath sounds: Normal breath sounds.   Abdominal:      General: Abdomen is flat. Bowel sounds are normal. There is no distension.      Palpations: Abdomen is soft. There is no mass.      Tenderness: There is abdominal tenderness in the epigastric area and left upper quadrant. There is no guarding or rebound. Negative signs include Franco's sign.      Hernia: No hernia is present.   Skin:     General: Skin is warm.      Capillary Refill: Capillary refill takes less than 2 seconds.   Neurological:      General: No focal deficit present.      Mental Status: She is alert and oriented to person, place, and time.   Psychiatric:         Mood and Affect: Mood normal.         Behavior: Behavior normal.         Thought Content: Thought content normal.         Judgment: Judgment normal.         CLINICAL DATA REVIEWED   reviewed previous lab results and integrated with " today's visit, reviewed notes from other physicians and/or last GI encounter, reviewed previous endoscopy results and available photos, reviewed surgical pathology results from previous biopsies    ASSESSMENT  Diagnoses and all orders for this visit:    Gastroesophageal reflux disease with esophagitis without hemorrhage  -     omeprazole (priLOSEC) 40 MG capsule; Take 1 capsule by mouth Daily.    Other orders  -     albuterol sulfate  (90 Base) MCG/ACT inhaler  -     amoxicillin-clavulanate (AUGMENTIN) 875-125 MG per tablet  -     metaxalone (SKELAXIN) 800 MG tablet  -     phentermine (ADIPEX-P) 37.5 MG tablet          PLAN    Resume daily PPI, treat breakthrough with OTC antacids. Will assess response at follow-up to determine increasing PPI vs EGS.    Return in about 2 months (around 12/26/2022).    I have discussed the above plan with the patient.  They verbalize understanding and are in agreement with the plan.  They have been advised to contact the office for any questions, concerns, or changes related to their health.

## 2022-12-31 ENCOUNTER — HOSPITAL ENCOUNTER (EMERGENCY)
Facility: HOSPITAL | Age: 67
Discharge: HOME | End: 2022-12-31
Attending: EMERGENCY MEDICINE | Admitting: EMERGENCY MEDICINE
Payer: MEDICARE

## 2022-12-31 VITALS
OXYGEN SATURATION: 95 % | HEART RATE: 80 BPM | RESPIRATION RATE: 24 BRPM | HEIGHT: 59 IN | TEMPERATURE: 96.9 F | BODY MASS INDEX: 41.12 KG/M2 | WEIGHT: 204 LBS | DIASTOLIC BLOOD PRESSURE: 102 MMHG | SYSTOLIC BLOOD PRESSURE: 150 MMHG

## 2022-12-31 DIAGNOSIS — F12.10 MILD TETRAHYDROCANNABINOL (THC) ABUSE: ICD-10-CM

## 2022-12-31 DIAGNOSIS — R11.2 NAUSEA AND VOMITING, UNSPECIFIED VOMITING TYPE: Primary | ICD-10-CM

## 2022-12-31 LAB
ALBUMIN SERPL-MCNC: 3.9 G/DL (ref 3.5–5.2)
ALBUMIN/GLOB SERPL: 1.3 G/DL
ALP SERPL-CCNC: 86 U/L (ref 39–117)
ALT SERPL W P-5'-P-CCNC: 12 U/L (ref 1–33)
AMPHET+METHAMPHET UR QL: NEGATIVE
AMPHETAMINES UR QL: NEGATIVE
ANION GAP SERPL CALCULATED.3IONS-SCNC: 11.6 MMOL/L (ref 5–15)
AST SERPL-CCNC: 18 U/L (ref 1–32)
BACTERIA UR QL AUTO: NORMAL /HPF
BARBITURATES UR QL SCN: NEGATIVE
BASOPHILS # BLD AUTO: 0.02 10*3/MM3 (ref 0–0.2)
BASOPHILS NFR BLD AUTO: 0.1 % (ref 0–1.5)
BENZODIAZ UR QL SCN: NEGATIVE
BILIRUB SERPL-MCNC: 0.3 MG/DL (ref 0–1.2)
BILIRUB UR QL STRIP: NEGATIVE
BUN SERPL-MCNC: 14 MG/DL (ref 8–23)
BUN/CREAT SERPL: 17.5 (ref 7–25)
BUPRENORPHINE SERPL-MCNC: NEGATIVE NG/ML
CALCIUM SPEC-SCNC: 9 MG/DL (ref 8.6–10.5)
CANNABINOIDS SERPL QL: POSITIVE
CHLORIDE SERPL-SCNC: 107 MMOL/L (ref 98–107)
CLARITY UR: CLEAR
CO2 SERPL-SCNC: 25.4 MMOL/L (ref 22–29)
COCAINE UR QL: NEGATIVE
COLOR UR: YELLOW
CREAT SERPL-MCNC: 0.8 MG/DL (ref 0.57–1)
DEPRECATED RDW RBC AUTO: 46.6 FL (ref 37–54)
EGFRCR SERPLBLD CKD-EPI 2021: 80.9 ML/MIN/1.73
EOSINOPHIL # BLD AUTO: 0.04 10*3/MM3 (ref 0–0.4)
EOSINOPHIL NFR BLD AUTO: 0.3 % (ref 0.3–6.2)
ERYTHROCYTE [DISTWIDTH] IN BLOOD BY AUTOMATED COUNT: 14.1 % (ref 12.3–15.4)
ETHANOL BLD-MCNC: <10 MG/DL (ref 0–10)
ETHANOL UR QL: <0.01 %
GLOBULIN UR ELPH-MCNC: 3 GM/DL
GLUCOSE SERPL-MCNC: 121 MG/DL (ref 65–99)
GLUCOSE UR STRIP-MCNC: NEGATIVE MG/DL
HCT VFR BLD AUTO: 42.6 % (ref 34–46.6)
HGB BLD-MCNC: 13.5 G/DL (ref 12–15.9)
HGB UR QL STRIP.AUTO: NEGATIVE
HYALINE CASTS UR QL AUTO: NORMAL /LPF
IMM GRANULOCYTES # BLD AUTO: 0.06 10*3/MM3 (ref 0–0.05)
IMM GRANULOCYTES NFR BLD AUTO: 0.4 % (ref 0–0.5)
KETONES UR QL STRIP: ABNORMAL
LEUKOCYTE ESTERASE UR QL STRIP.AUTO: ABNORMAL
LIPASE SERPL-CCNC: 30 U/L (ref 13–60)
LYMPHOCYTES # BLD AUTO: 1.32 10*3/MM3 (ref 0.7–3.1)
LYMPHOCYTES NFR BLD AUTO: 9 % (ref 19.6–45.3)
MCH RBC QN AUTO: 28 PG (ref 26.6–33)
MCHC RBC AUTO-ENTMCNC: 31.7 G/DL (ref 31.5–35.7)
MCV RBC AUTO: 88.4 FL (ref 79–97)
METHADONE UR QL SCN: NEGATIVE
MONOCYTES # BLD AUTO: 0.44 10*3/MM3 (ref 0.1–0.9)
MONOCYTES NFR BLD AUTO: 3 % (ref 5–12)
NEUTROPHILS NFR BLD AUTO: 12.72 10*3/MM3 (ref 1.7–7)
NEUTROPHILS NFR BLD AUTO: 87.2 % (ref 42.7–76)
NITRITE UR QL STRIP: NEGATIVE
OPIATES UR QL: NEGATIVE
OXYCODONE UR QL SCN: NEGATIVE
PCP UR QL SCN: NEGATIVE
PH UR STRIP.AUTO: 7 [PH] (ref 4.5–8)
PLATELET # BLD AUTO: 200 10*3/MM3 (ref 140–450)
PMV BLD AUTO: 10.2 FL (ref 6–12)
POTASSIUM SERPL-SCNC: 3.7 MMOL/L (ref 3.5–5.2)
PROPOXYPH UR QL: NEGATIVE
PROT SERPL-MCNC: 6.9 G/DL (ref 6–8.5)
PROT UR QL STRIP: ABNORMAL
RBC # BLD AUTO: 4.82 10*6/MM3 (ref 3.77–5.28)
RBC # UR STRIP: NORMAL /HPF
REF LAB TEST METHOD: NORMAL
SODIUM SERPL-SCNC: 144 MMOL/L (ref 136–145)
SP GR UR STRIP: 1.02 (ref 1–1.03)
SQUAMOUS #/AREA URNS HPF: NORMAL /HPF
TRICYCLICS UR QL SCN: NEGATIVE
UROBILINOGEN UR QL STRIP: ABNORMAL
WBC # UR STRIP: NORMAL /HPF
WBC NRBC COR # BLD: 14.6 10*3/MM3 (ref 3.4–10.8)

## 2022-12-31 PROCEDURE — 36415 COLL VENOUS BLD VENIPUNCTURE: CPT

## 2022-12-31 PROCEDURE — 25010000002 ONDANSETRON PER 1 MG: Performed by: EMERGENCY MEDICINE

## 2022-12-31 PROCEDURE — 81001 URINALYSIS AUTO W/SCOPE: CPT | Performed by: EMERGENCY MEDICINE

## 2022-12-31 PROCEDURE — 99283 EMERGENCY DEPT VISIT LOW MDM: CPT

## 2022-12-31 PROCEDURE — 82077 ASSAY SPEC XCP UR&BREATH IA: CPT | Performed by: EMERGENCY MEDICINE

## 2022-12-31 PROCEDURE — 85025 COMPLETE CBC W/AUTO DIFF WBC: CPT | Performed by: EMERGENCY MEDICINE

## 2022-12-31 PROCEDURE — 80053 COMPREHEN METABOLIC PANEL: CPT | Performed by: EMERGENCY MEDICINE

## 2022-12-31 PROCEDURE — 96374 THER/PROPH/DIAG INJ IV PUSH: CPT

## 2022-12-31 PROCEDURE — 80306 DRUG TEST PRSMV INSTRMNT: CPT | Performed by: EMERGENCY MEDICINE

## 2022-12-31 PROCEDURE — 83690 ASSAY OF LIPASE: CPT | Performed by: EMERGENCY MEDICINE

## 2022-12-31 RX ORDER — SODIUM CHLORIDE 0.9 % (FLUSH) 0.9 %
10 SYRINGE (ML) INJECTION AS NEEDED
Status: DISCONTINUED | OUTPATIENT
Start: 2022-12-31 | End: 2022-12-31 | Stop reason: HOSPADM

## 2022-12-31 RX ORDER — ONDANSETRON 8 MG/1
TABLET, ORALLY DISINTEGRATING ORAL
Qty: 10 TABLET | Refills: 0 | Status: SHIPPED | OUTPATIENT
Start: 2022-12-31

## 2022-12-31 RX ORDER — ONDANSETRON 2 MG/ML
8 INJECTION INTRAMUSCULAR; INTRAVENOUS ONCE
Status: COMPLETED | OUTPATIENT
Start: 2022-12-31 | End: 2022-12-31

## 2022-12-31 RX ADMIN — ONDANSETRON 8 MG: 2 INJECTION INTRAMUSCULAR; INTRAVENOUS at 17:58

## 2022-12-31 RX ADMIN — SODIUM CHLORIDE 1000 ML: 9 INJECTION, SOLUTION INTRAVENOUS at 17:33

## 2022-12-31 NOTE — ED PROVIDER NOTES
Subjective     History provided by:  Patient    History of Present Illness    · Chief complaint: Nausea and vomiting    · Location: From her stomach    · Quality/Severity: Severe nausea has vomited about 5 times.    · Timing/Onset: Started at 1 PM after she ate a marijuana laced cookie.    · Modifying Factors: None known.    · Associated symptoms: Denies abdominal pain, fever or diarrhea.    · Narrative: The patient states she was at a family get together earlier today about 1 PM when she ate a cookie laced with marijuana.  She states right afterwards she became severely nauseated and has vomited numerous times.  She has not had any diarrhea, denies abdominal pain and denies fever.  She denies any alcohol or other drugs.  She states her only medical problem is obesity.  She states she does not regularly consume marijuana.  She does not smoke cigarettes.  She lives with her .  She states her nausea is now starting to feel little better.    Review of Systems   Constitutional: Positive for activity change, appetite change and fatigue. Negative for chills, diaphoresis and fever.   HENT: Negative for congestion, dental problem, ear pain, hearing loss, mouth sores, postnasal drip, rhinorrhea, sinus pressure, sore throat and voice change.    Eyes: Negative for photophobia, pain, discharge, redness and visual disturbance.   Respiratory: Negative for cough, chest tightness, shortness of breath, wheezing and stridor.    Cardiovascular: Negative for chest pain, palpitations and leg swelling.   Gastrointestinal: Positive for nausea and vomiting. Negative for abdominal pain and diarrhea.   Genitourinary: Negative for difficulty urinating, dysuria, flank pain, frequency, hematuria and urgency.   Musculoskeletal: Negative for arthralgias, back pain, gait problem, joint swelling, myalgias, neck pain and neck stiffness.   Skin: Negative for color change and rash.   Neurological: Negative for dizziness, tremors, seizures,  syncope, facial asymmetry, speech difficulty, weakness, light-headedness, numbness and headaches.   Hematological: Negative for adenopathy.   Psychiatric/Behavioral: Negative.  Negative for confusion and decreased concentration. The patient is not nervous/anxious.      Past Medical History:   Diagnosis Date   • Abnormal Pap smear of cervix    • Arthritis    • At risk for sleep apnea     STOP BANG 5   • Cervical dysplasia     s/p LEEP   • Colon polyps    • Cystocele with rectocele    • Diverticulitis    • Dyspepsia    • Gastritis    • GERD (gastroesophageal reflux disease)    • Hearing loss    • Heartburn    • Hemorrhoids    • History of cervical dysplasia    • History of hypertension     OFF MED SINCE 1/2018   • Hypertension    • Hypothyroidism    • Indigestion    • Joint pain    • Mastodynia    • Migraine    • Ovarian cyst 11/2015    RIGHT   • Primary osteoarthritis of right knee    • Reflux esophagitis    • Slow to wake up after anesthesia    • Tinnitus     RT EAR   • Urinary tract infection     recurrent UTIs a few years ago   • Uterine prolapse    • Varicose veins of bilateral lower extremities with other complications      BP (!) 150/102   Pulse 80   Temp 96.9 °F (36.1 °C) (Temporal)   Resp 24   Ht 149.9 cm (59\")   Wt 92.5 kg (204 lb)   LMP  (LMP Unknown)   SpO2 95%   BMI 41.20 kg/m²     Past Medical History:   Diagnosis Date   • Abnormal Pap smear of cervix    • Arthritis    • At risk for sleep apnea     STOP BANG 5   • Cervical dysplasia     s/p LEEP   • Colon polyps    • Cystocele with rectocele    • Diverticulitis    • Dyspepsia    • Gastritis    • GERD (gastroesophageal reflux disease)    • Hearing loss    • Heartburn    • Hemorrhoids    • History of cervical dysplasia    • History of hypertension     OFF MED SINCE 1/2018   • Hypertension    • Hypothyroidism    • Indigestion    • Joint pain    • Mastodynia    • Migraine    • Ovarian cyst 11/2015    RIGHT   • Primary osteoarthritis of right knee     • Reflux esophagitis    • Slow to wake up after anesthesia    • Tinnitus     RT EAR   • Urinary tract infection     recurrent UTIs a few years ago   • Uterine prolapse    • Varicose veins of bilateral lower extremities with other complications        Allergies   Allergen Reactions   • Levofloxacin Myalgia     Lower extremities  Other reaction(s): Arthralgia (Joint Pain)  Other reaction(s): Arthralgia (joint pain)       Past Surgical History:   Procedure Laterality Date   • CERVICAL BIOPSY  W/ LOOP ELECTRODE EXCISION     • COLONOSCOPY N/A 4/19/2021    Procedure: COLONOSCOPY;  Surgeon: Simeon Conn MD;  Location: [facility] OR;  Service: Gastroenterology;  Laterality: N/A;  CECAL TIME AT 0922  DIVERTICULOSIS   • COLONOSCOPY W/ POLYPECTOMY N/A 12/04/2015    SIGMOID DIVERTICULOSIS, 1 TUBULAR ADENOMA COLON POLYP, RESCOPE IN 5 YRS, DR. SIMEON CONN   • D & C HYSTEROSCOPY N/A    • DILATATION AND CURETTAGE      DUB   • ENDOSCOPY N/A 4/29/2016    NODULAR GASTRITIS, REFLUX ESOPHAGITIS, BX:BENIGN, DR. SIMEON CONN AT [facility]   • HEMORRHOIDECTOMY N/A 6/21/2018    Procedure: HEMORRHOIDECTOMY x3;  Surgeon: Valery Alanis MD;  Location: [facility];  Service: General   • TUBAL ABDOMINAL LIGATION Bilateral    • VEIN LIGATION AND STRIPPING Left        Family History   Problem Relation Age of Onset   • Heart disease Father    • Diabetes Mother    • Colon cancer Mother 75   • Colon polyps Mother    • Hypertension Sister    • Colon polyps Sister    • Deep vein thrombosis Sister    • Pulmonary embolism Sister    • Hypertension Sister    • Malig Hyperthermia Neg Hx    • Breast cancer Neg Hx    • Ovarian cancer Neg Hx        Social History     Socioeconomic History   • Marital status:    Tobacco Use   • Smoking status: Former     Years: 4.00     Types: Cigarettes   • Smokeless tobacco: Never   • Tobacco comments:     QUIT 39 YEARS AGO   Vaping Use   • Vaping Use: Never used   Substance and Sexual Activity    • Alcohol use: Yes     Comment: occasionally   • Drug use: No   • Sexual activity: Yes     Partners: Male     Birth control/protection: Surgical, Post-menopausal     Comment: BTL           Objective   Physical Exam  Vitals and nursing note reviewed.   Constitutional:       Appearance: She is obese.      Comments: The patient is morbidly obese.  She appears to not feel well.  She does not appear toxic.  She appears in distress due to nausea.   HENT:      Head: Normocephalic and atraumatic.      Nose: Nose normal.      Mouth/Throat:      Mouth: Mucous membranes are moist.      Pharynx: Oropharynx is clear.   Eyes:      General: No scleral icterus.     Conjunctiva/sclera: Conjunctivae normal.   Cardiovascular:      Rate and Rhythm: Normal rate and regular rhythm.      Heart sounds: No murmur heard.  Pulmonary:      Effort: Pulmonary effort is normal.      Breath sounds: Normal breath sounds.   Abdominal:      General: Bowel sounds are normal.      Palpations: Abdomen is soft.      Tenderness: There is no guarding or rebound.   Musculoskeletal:         General: No tenderness or signs of injury.      Cervical back: Normal range of motion and neck supple.      Right lower leg: No edema.      Left lower leg: No edema.   Skin:     General: Skin is warm and dry.      Capillary Refill: Capillary refill takes less than 2 seconds.   Neurological:      General: No focal deficit present.      Mental Status: She is alert and oriented to person, place, and time.      Cranial Nerves: No cranial nerve deficit.      Sensory: No sensory deficit.      Motor: No weakness.   Psychiatric:      Comments: Mood and affect consistent with not feeling well.         Procedures           ED Course  ED Course as of 12/31/22 2052   Sat Dec 31, 2022   2050 Review the patient's laboratory studies: The patient CBC has an elevated white count of 14.6 with a left shift.  Hemoglobin, hematocrit and platelets within normal limits.  CMP has an elevated  glucose of 121, otherwise normal electrolytes, renal and liver function test.  The patient's BUN is 14 consistent with euvolemia.  The patient's lipase was normal.  Ethanol level is 0.  Urine tox screen positive for THC.  Urinalysis negative for blood or infection. [TP]   2051 The patient was administered a liter normal saline IV bolus.  She was administered Zofran 8 mg IV.  Repeat examination at 20: 30 the patient states she was feeling better. [TP]      ED Course User Index  [TP] Raulito Frausto MD                                           Medical Decision Making  Mild tetrahydrocannabinol (THC) abuse: acute illness or injury  Nausea and vomiting, unspecified vomiting type: acute illness or injury  Amount and/or Complexity of Data Reviewed  Labs: ordered. Decision-making details documented in ED Course.      Risk  Prescription drug management.          Final diagnoses:   Nausea and vomiting, unspecified vomiting type   Mild tetrahydrocannabinol (THC) abuse       ED Disposition  ED Disposition     ED Disposition   Discharge    Condition   Stable    Comment   --             Paula Bates, APRN  [address] [facility] [address] [phone]    Schedule an appointment as soon as possible for a visit   As needed         Medication List      New Prescriptions    ondansetron ODT 8 MG disintegrating tablet  Commonly known as: ZOFRAN-ODT  One tablet po q 6 hours PRN nausea and vomiting        Stop    amoxicillin-clavulanate 875-125 MG per tablet  Commonly known as: AUGMENTIN     metaxalone 800 MG tablet  Commonly known as: SKELAXIN     phentermine 37.5 MG tablet  Commonly known as: ADIPEX-P           Where to Get Your Medications      These medications were sent to [id number] [place] [facility] [address] - [phone]  - [phone] FX [address] [place] [address]    Phone: [phone]   · ondansetron ODT 8 MG disintegrating tablet         Labs Reviewed   COMPREHENSIVE METABOLIC PANEL -  Abnormal; Notable for the following components:       Result Value    Glucose 121 (*)     All other components within normal limits    Narrative:     GFR Normal >60  Chronic Kidney Disease <60  Kidney Failure <15     URINALYSIS W/ MICROSCOPIC IF INDICATED (NO CULTURE) - Abnormal; Notable for the following components:    Ketones, UA 15 mg/dL (1+) (*)     Protein, UA 30 mg/dL (1+) (*)     Leuk Esterase, UA Small (1+) (*)     All other components within normal limits   URINE DRUG SCREEN - Abnormal; Notable for the following components:    THC, Screen, Urine Positive (*)     All other components within normal limits    Narrative:     Urine drug screen results are to be used for medical purposes only.  They are not to be used for legal purposes such as employment testing.  Negative results do not necessarily mean the complete absence of a subtance, but rather that the result is less than the cutoff for that substance.  Positive results are unconfirmed and considered Preliminary Positive.  [facility] does not automatically confirm Postitive Unconfirmed results.  The physician may request (order) an Unconfirmed Positive result to be sent out for confirmation.      Negative Thresholds for Drugs Screened:    THC screen, urine                          50 ng/ml  Phenycyclidine (PCP), urine                25 ng/ml  Cocaine screen, urine                     150 ng/ml  Methamphetamine, urine                    500 ng/ml  Opiate screen, urine                      100 ng/ml  Amphetamine screen, urine                 500 ng/ml  Benzodiazepine screen, urine              150 ng/ml  Tricyclic Antidepressants screen, urine   300 ng/ml  Methadone screen, urine                   200 ng/ml  Barbiturates screen, urine                200 ng/ml  Oxycodone screen, urine                   100 ng/ml  Propoxyphene screen, urine                300 ng/ml  Buprenorphine screen, urine                10 ng/ml   CBC WITH AUTO  DIFFERENTIAL - Abnormal; Notable for the following components:    WBC 14.60 (*)     Neutrophil % 87.2 (*)     Lymphocyte % 9.0 (*)     Monocyte % 3.0 (*)     Neutrophils, Absolute 12.72 (*)     Immature Grans, Absolute 0.06 (*)     All other components within normal limits   LIPASE - Normal   ETHANOL   URINALYSIS, MICROSCOPIC ONLY   CBC AND DIFFERENTIAL    Narrative:     The following orders were created for panel order CBC & Differential.  Procedure                               Abnormality         Status                     ---------                               -----------         ------                     CBC Auto Differential[[id number]]        Abnormal            Final result                 Please view results for these tests on the individual orders.     No orders to display          Medication List      New Prescriptions    ondansetron ODT 8 MG disintegrating tablet  Commonly known as: ZOFRAN-ODT  One tablet po q 6 hours PRN nausea and vomiting        Stop    amoxicillin-clavulanate 875-125 MG per tablet  Commonly known as: AUGMENTIN     metaxalone 800 MG tablet  Commonly known as: SKELAXIN     phentermine 37.5 MG tablet  Commonly known as: ADIPEX-P           Where to Get Your Medications      These medications were sent to [id number] [place] [facility] [address] - [phone]  - [phone] FX  [address] [facility] [address]    Phone: [phone]   · ondansetron ODT 8 MG disintegrating tablet              Raulito Frausto MD  12/31/22 2052

## 2023-02-08 ENCOUNTER — TRANSCRIBE ORDERS (OUTPATIENT)
Dept: ULTRASOUND IMAGING | Facility: HOSPITAL | Age: 68
End: 2023-02-08
Payer: MEDICARE

## 2023-02-08 DIAGNOSIS — R10.11 RUQ PAIN: Primary | ICD-10-CM

## 2023-02-09 ENCOUNTER — TRANSCRIBE ORDERS (OUTPATIENT)
Dept: ADMINISTRATIVE | Facility: HOSPITAL | Age: 68
End: 2023-02-09
Payer: MEDICARE

## 2023-02-09 DIAGNOSIS — Z12.31 VISIT FOR SCREENING MAMMOGRAM: Primary | ICD-10-CM

## 2023-02-15 ENCOUNTER — HOSPITAL ENCOUNTER (OUTPATIENT)
Dept: ULTRASOUND IMAGING | Facility: HOSPITAL | Age: 68
Discharge: HOME | End: 2023-02-15
Admitting: NURSE PRACTITIONER
Payer: MEDICARE

## 2023-02-15 DIAGNOSIS — R10.11 RUQ PAIN: ICD-10-CM

## 2023-02-15 PROCEDURE — 76705 ECHO EXAM OF ABDOMEN: CPT

## 2023-03-06 ENCOUNTER — TRANSCRIBE ORDERS (OUTPATIENT)
Dept: NUCLEAR MEDICINE | Facility: HOSPITAL | Age: 68
End: 2023-03-06
Payer: MEDICARE

## 2023-03-06 ENCOUNTER — HOSPITAL ENCOUNTER (OUTPATIENT)
Dept: MAMMOGRAPHY | Facility: HOSPITAL | Age: 68
Discharge: HOME | End: 2023-03-06
Admitting: OBSTETRICS & GYNECOLOGY
Payer: MEDICARE

## 2023-03-06 DIAGNOSIS — Z12.31 VISIT FOR SCREENING MAMMOGRAM: ICD-10-CM

## 2023-03-06 DIAGNOSIS — R10.11 RIGHT UPPER QUADRANT PAIN: Primary | ICD-10-CM

## 2023-03-06 PROCEDURE — 77063 BREAST TOMOSYNTHESIS BI: CPT

## 2023-03-06 PROCEDURE — 77067 SCR MAMMO BI INCL CAD: CPT

## 2023-03-20 ENCOUNTER — HOSPITAL ENCOUNTER (OUTPATIENT)
Dept: NUCLEAR MEDICINE | Facility: HOSPITAL | Age: 68
Discharge: HOME | End: 2023-03-20
Payer: MEDICARE

## 2023-03-20 DIAGNOSIS — R10.11 RIGHT UPPER QUADRANT PAIN: ICD-10-CM

## 2023-03-20 PROCEDURE — 78227 HEPATOBIL SYST IMAGE W/DRUG: CPT

## 2023-03-20 PROCEDURE — A9537 TC99M MEBROFENIN: HCPCS | Performed by: NURSE PRACTITIONER

## 2023-03-20 PROCEDURE — 0 TECHNETIUM TC 99M MEBROFENIN KIT: Performed by: NURSE PRACTITIONER

## 2023-03-20 PROCEDURE — 25010000002 SINCALIDE PER 5 MCG: Performed by: NURSE PRACTITIONER

## 2023-03-20 RX ORDER — KIT FOR THE PREPARATION OF TECHNETIUM TC 99M MEBROFENIN 45 MG/10ML
1 INJECTION, POWDER, LYOPHILIZED, FOR SOLUTION INTRAVENOUS
Status: COMPLETED | OUTPATIENT
Start: 2023-03-20 | End: 2023-03-20

## 2023-03-20 RX ADMIN — MEBROFENIN 1 DOSE: 45 INJECTION, POWDER, LYOPHILIZED, FOR SOLUTION INTRAVENOUS at 06:55

## 2023-03-20 RX ADMIN — SODIUM CHLORIDE 1.9 MCG: 9 INJECTION, SOLUTION INTRAVENOUS at 08:00

## 2023-03-31 ENCOUNTER — HOSPITAL ENCOUNTER (OUTPATIENT)
Dept: GENERAL RADIOLOGY | Facility: HOSPITAL | Age: 68
Discharge: HOME | End: 2023-03-31
Admitting: NURSE PRACTITIONER
Payer: MEDICARE

## 2023-03-31 ENCOUNTER — TRANSCRIBE ORDERS (OUTPATIENT)
Dept: ADMINISTRATIVE | Facility: HOSPITAL | Age: 68
End: 2023-03-31
Payer: MEDICARE

## 2023-03-31 DIAGNOSIS — M79.642 LEFT HAND PAIN: Primary | ICD-10-CM

## 2023-03-31 DIAGNOSIS — M79.642 LEFT HAND PAIN: ICD-10-CM

## 2023-03-31 PROCEDURE — 73130 X-RAY EXAM OF HAND: CPT

## 2023-09-13 ENCOUNTER — OFFICE VISIT (OUTPATIENT)
Dept: OBSTETRICS AND GYNECOLOGY | Facility: CLINIC | Age: 68
End: 2023-09-13
Payer: MEDICARE

## 2023-09-13 VITALS
SYSTOLIC BLOOD PRESSURE: 136 MMHG | WEIGHT: 207.2 LBS | DIASTOLIC BLOOD PRESSURE: 90 MMHG | HEIGHT: 59 IN | BODY MASS INDEX: 41.77 KG/M2

## 2023-09-13 DIAGNOSIS — Z09 ENCOUNTER FOR FOLLOW-UP EXAMINATION AFTER COMPLETED TREATMENT FOR CONDITIONS OTHER THAN MALIGNANT NEOPLASM: ICD-10-CM

## 2023-09-13 DIAGNOSIS — Z01.419 PAP SMEAR, LOW-RISK: Primary | ICD-10-CM

## 2023-09-13 DIAGNOSIS — Z11.51 SPECIAL SCREENING EXAMINATION FOR HUMAN PAPILLOMAVIRUS (HPV): ICD-10-CM

## 2023-09-13 DIAGNOSIS — Z01.419 ROUTINE GYNECOLOGICAL EXAMINATION: ICD-10-CM

## 2023-09-13 DIAGNOSIS — N81.10 CYSTOCELE WITH RECTOCELE: ICD-10-CM

## 2023-09-13 DIAGNOSIS — N81.6 CYSTOCELE WITH RECTOCELE: ICD-10-CM

## 2023-09-13 RX ORDER — NIRMATRELVIR AND RITONAVIR 300-100 MG
KIT ORAL
COMMUNITY
Start: 2023-09-01

## 2023-09-13 RX ORDER — CLONAZEPAM 0.5 MG/1
TABLET ORAL
COMMUNITY
Start: 2023-07-24

## 2023-09-13 NOTE — PROGRESS NOTES
GYN Annual Exam     Chief Complaint   Patient presents with   • Annual Exam       Nadia Valverde is a 67 y.o. female who presents for annual well woman exam. She is an established patient but new to me. Periods absent since age 50. She denies vaginal spotting or discharge. No HRT. Denies urinary issues. She does do SBE monthly.     OB History          2    Para   2    Term   2            AB        Living   2         SAB        IAB        Ectopic        Molar        Multiple        Live Births              Obstetric Comments   2                Mammogram: 3/23  Dexa scan: Normal   Colonoscopy:   Last Pap :  NIL  History of abnormal Pap smear: yes - pap ASCUS/No HPV d/t insurance . Colpo HPV effect .   Family history of uterine, colon or ovarian cancer: yes - Mother  Family history of breast cancer: no  History of abnormal mammogram: no        Past Medical History:   Diagnosis Date   • Abnormal Pap smear of cervix    • Arthritis    • At risk for sleep apnea     STOP BANG 5   • Cervical dysplasia     s/p LEEP   • Colon polyps    • Cystocele with rectocele    • Diverticulitis    • Dyspepsia    • Gastritis    • GERD (gastroesophageal reflux disease)    • Hearing loss    • Heartburn    • Hemorrhoids    • History of cervical dysplasia    • History of hypertension     OFF MED SINCE 2018   • Hypertension    • Hypothyroidism    • Indigestion    • Joint pain    • Mastodynia    • Migraine    • Ovarian cyst 2015    RIGHT   • Primary osteoarthritis of right knee    • Reflux esophagitis    • Slow to wake up after anesthesia    • Tinnitus     RT EAR   • Urinary tract infection     recurrent UTIs a few years ago   • Uterine prolapse    • Varicose veins of bilateral lower extremities with other complications        Past Surgical History:   Procedure Laterality Date   • CERVICAL BIOPSY  W/ LOOP ELECTRODE EXCISION     • COLONOSCOPY N/A 2021    Procedure: COLONOSCOPY;  Surgeon: Jaziel  Simeon Pandey MD;  Location: [facility] OR;  Service: Gastroenterology;  Laterality: N/A;  CECAL TIME AT 0922  DIVERTICULOSIS   • COLONOSCOPY W/ POLYPECTOMY N/A 12/04/2015    SIGMOID DIVERTICULOSIS, 1 TUBULAR ADENOMA COLON POLYP, RESCOPE IN 5 YRS, DR. SIMEON CONN   • D & C HYSTEROSCOPY N/A    • DILATATION AND CURETTAGE      DUB   • ENDOSCOPY N/A 4/29/2016    NODULAR GASTRITIS, REFLUX ESOPHAGITIS, BX:BENIGN, DR. SIMEON CONN AT [facility]   • HEMORRHOIDECTOMY N/A 6/21/2018    Procedure: HEMORRHOIDECTOMY x3;  Surgeon: Valery Alanis MD;  Location: [facility] MA[facility] OR;  Service: General   • TUBAL ABDOMINAL LIGATION Bilateral    • VEIN LIGATION AND STRIPPING Left          Current Outpatient Medications:   •  albuterol sulfate  (90 Base) MCG/ACT inhaler, , Disp: , Rfl:   •  clonazePAM (KlonoPIN) 0.5 MG tablet, , Disp: , Rfl:   •  levothyroxine (SYNTHROID, LEVOTHROID) 50 MCG tablet, Synthroid 50 mcg tablet  Take 1 tablet every day by oral route., Disp: , Rfl:   •  meclizine (ANTIVERT) 25 MG tablet, meclizine 25 mg tablet  Take 1 tablet 3 times a day by oral route as needed., Disp: , Rfl:   •  omeprazole (priLOSEC) 40 MG capsule, Take 1 capsule by mouth Daily., Disp: 90 capsule, Rfl: 3  •  Paxlovid, 300/100, 20 x 150 MG & 10 x 100MG tablet therapy pack tablet, , Disp: , Rfl:   •  hydrocortisone (ANUSOL-HC) 25 MG suppository, Insert 1 suppository into the rectum 2 (Two) Times a Day., Disp: 12 suppository, Rfl: 11  •  meloxicam (MOBIC) 15 MG tablet, meloxicam 15 mg tablet  Take 1 tablet every day by oral route., Disp: , Rfl:   •  ondansetron ODT (ZOFRAN-ODT) 8 MG disintegrating tablet, One tablet po q 6 hours PRN nausea and vomiting, Disp: 10 tablet, Rfl: 0  •  penciclovir (Denavir) 1 % cream, Denavir 1 % topical cream, Disp: , Rfl:     Allergies   Allergen Reactions   • Levofloxacin Myalgia     Lower extremities  Other reaction(s): Arthralgia (Joint Pain)  Other reaction(s): Arthralgia (joint pain)       Social  "History     Tobacco Use   • Smoking status: Former     Years: 4.00     Types: Cigarettes   • Smokeless tobacco: Never   • Tobacco comments:     QUIT 39 YEARS AGO   Vaping Use   • Vaping Use: Never used   Substance Use Topics   • Alcohol use: Yes     Comment: occasionally   • Drug use: No       Family History   Problem Relation Age of Onset   • Heart disease Father    • Diabetes Mother    • Colon cancer Mother 75   • Colon polyps Mother    • Hypertension Sister    • Colon polyps Sister    • Deep vein thrombosis Sister    • Pulmonary embolism Sister    • Hypertension Sister    • Malig Hyperthermia Neg Hx    • Breast cancer Neg Hx    • Ovarian cancer Neg Hx        Review of Systems   Constitutional: Negative.    Respiratory: Negative.     Cardiovascular: Negative.    Gastrointestinal: Negative.    Endocrine: Negative.    Genitourinary: Negative.    Musculoskeletal: Negative.    Skin: Negative.    Neurological: Negative.    Psychiatric/Behavioral: Negative.       /90   Ht 149.9 cm (59.02\")   Wt 94 kg (207 lb 3.2 oz)   LMP  (LMP Unknown)   BMI 41.83 kg/m²     Physical Exam  Vitals reviewed.   Constitutional:       Appearance: She is well-developed.   Neck:      Thyroid: No thyromegaly.   Cardiovascular:      Rate and Rhythm: Normal rate and regular rhythm.   Pulmonary:      Effort: Pulmonary effort is normal.      Breath sounds: Normal breath sounds.   Chest:   Breasts:     Right: No inverted nipple, mass, nipple discharge, skin change or tenderness.      Left: No inverted nipple, mass, nipple discharge, skin change or tenderness.   Abdominal:      General: Bowel sounds are normal.      Palpations: Abdomen is soft.   Genitourinary:     Exam position: Supine.      Labia:         Right: No rash, tenderness, lesion or injury.         Left: No rash, tenderness, lesion or injury.       Vagina: No signs of injury and foreign body. No vaginal discharge, erythema, tenderness or bleeding.      Cervix: No cervical motion " tenderness, discharge or friability.      Uterus: Not deviated, not enlarged, not fixed and not tender.       Adnexa:         Right: No mass, tenderness or fullness.          Left: No mass, tenderness or fullness.        Rectum: Normal.      Comments: Cystocele and rectocele noted   Musculoskeletal:         General: Normal range of motion.      Cervical back: Normal range of motion and neck supple.   Skin:     General: Skin is warm and dry.   Neurological:      General: No focal deficit present.      Mental Status: She is alert and oriented to person, place, and time.   Psychiatric:         Mood and Affect: Mood normal.         Behavior: Behavior normal.          Assessment     1) GYN annual well woman exam.   2) Postmenopausal   3) H/O abnormal pap   4) Rectocele  5) Cystocele      Plan     1) Well woman exam- Check pap today. Clinical breast exam & mammogram yearly, Self breast awareness. Schedule screening mammogram.   2) Pap- H/o abnormal. Plan of care based off of results today  3) Rectocele and cystocele- Ref to pelvic floor PT. Offered pessary or ref to urogyn.   4 Smoking status- non smoker   5) Colon health - screening colonoscopy recommended if not up to date  6) Body mass index is 41.83 kg/m².  7) Bone health - Weight bearing exercise, dietary calcium recommendations and vitamin D  reviewed. Screening Dexa ordered.     Follow up prn and one year    Leighann Thompson, ELZA  9/13/2023  13:19 EDT

## 2023-09-14 ENCOUNTER — TELEPHONE (OUTPATIENT)
Dept: OBSTETRICS AND GYNECOLOGY | Facility: CLINIC | Age: 68
End: 2023-09-14

## 2023-09-14 NOTE — TELEPHONE ENCOUNTER
Caller: Nadia Valverde    Relationship: Self    Best call back number: [phone]    What orders are you requesting (i.e. lab or imaging): PELVIC FLOOR THERAPY    In what timeframe would the patient need to come in: WILL NEED ORDER ORDERS PRIOR TO 9/29 SHE HAS HER FIRST APPOINTMENT     Where will you receive your lab/imaging services: [facility]   [address] [place] [address]// PHONE #[phone]    Additional notes:          07-Jul-2017

## 2023-09-16 LAB
CYTOLOGIST CVX/VAG CYTO: NORMAL
CYTOLOGY CVX/VAG DOC CYTO: NORMAL
CYTOLOGY CVX/VAG DOC THIN PREP: NORMAL
DX ICD CODE: NORMAL
HIV 1 & 2 AB SER-IMP: NORMAL
HPV I/H RISK 4 DNA CVX QL PROBE+SIG AMP: NEGATIVE
OTHER STN SPEC: NORMAL
STAT OF ADQ CVX/VAG CYTO-IMP: NORMAL

## 2023-09-25 ENCOUNTER — APPOINTMENT (OUTPATIENT)
Dept: BONE DENSITY | Facility: HOSPITAL | Age: 68
End: 2023-09-25
Payer: MEDICARE

## 2023-09-25 DIAGNOSIS — Z09 ENCOUNTER FOR FOLLOW-UP EXAMINATION AFTER COMPLETED TREATMENT FOR CONDITIONS OTHER THAN MALIGNANT NEOPLASM: ICD-10-CM

## 2023-09-25 DIAGNOSIS — Z01.419 ROUTINE GYNECOLOGICAL EXAMINATION: ICD-10-CM

## 2023-09-25 PROCEDURE — 77080 DXA BONE DENSITY AXIAL: CPT

## 2023-11-15 ENCOUNTER — TELEPHONE (OUTPATIENT)
Dept: OBSTETRICS AND GYNECOLOGY | Facility: CLINIC | Age: 68
End: 2023-11-15

## 2023-11-15 NOTE — TELEPHONE ENCOUNTER
Caller: SHYAM    Relationship: [employer]    Best call back number: [phone] -PROVIDER CUE    What form or medical record are you requesting: OFFICE NOTES FROM ANNUALS PERFORMED 09/13/23 AND 08/05/21.    Who is requesting this form or medical record from you: [employer]    How would you like to receive the form or medical records (pick-up, mail, fax): FAX  If fax, what is the fax number: [phone] ATT:MEDICAL NOTE/MEDICAL CORRESPONDENCE     Timeframe paperwork needed: AS SOON AS POSSIBLE    Additional notes: JIN DENIED BILLING CODE: . PER JIN HOWE IS SAYING PATIENT'S ANNUAL/WWE WAS PERFORMED BEFORE 24 MONTH TIME FRAME. SHYAM IS REQUESTING OFFICE NOTES FROM 08/05/21 AND 09/13/23 SHOWING EXAMS WERE OVER 24 MONTH TIME FRAME.   CLAIM NUMBER: [id number]

## 2023-11-15 NOTE — TELEPHONE ENCOUNTER
They need to send us a request of records. I tried calling the number provided but was not able to reach anyone.

## 2023-11-22 DIAGNOSIS — K21.00 GASTROESOPHAGEAL REFLUX DISEASE WITH ESOPHAGITIS WITHOUT HEMORRHAGE: ICD-10-CM

## 2023-11-22 RX ORDER — OMEPRAZOLE 40 MG/1
40 CAPSULE, DELAYED RELEASE ORAL DAILY
Qty: 90 CAPSULE | Refills: 2 | OUTPATIENT
Start: 2023-11-22

## 2024-02-13 ENCOUNTER — TRANSCRIBE ORDERS (OUTPATIENT)
Dept: ADMINISTRATIVE | Facility: HOSPITAL | Age: 69
End: 2024-02-13
Payer: MEDICARE

## 2024-02-13 DIAGNOSIS — Z12.31 SCREENING MAMMOGRAM, ENCOUNTER FOR: Primary | ICD-10-CM

## 2024-03-18 ENCOUNTER — HOSPITAL ENCOUNTER (OUTPATIENT)
Dept: MAMMOGRAPHY | Facility: HOSPITAL | Age: 69
Discharge: HOME | End: 2024-03-18
Admitting: OBSTETRICS & GYNECOLOGY
Payer: MEDICARE

## 2024-03-18 DIAGNOSIS — Z12.31 SCREENING MAMMOGRAM, ENCOUNTER FOR: ICD-10-CM

## 2024-03-18 PROCEDURE — 77063 BREAST TOMOSYNTHESIS BI: CPT

## 2024-03-18 PROCEDURE — 77067 SCR MAMMO BI INCL CAD: CPT | Performed by: RADIOLOGY

## 2024-03-18 PROCEDURE — 77063 BREAST TOMOSYNTHESIS BI: CPT | Performed by: RADIOLOGY

## 2024-03-18 PROCEDURE — 77067 SCR MAMMO BI INCL CAD: CPT

## 2025-01-16 ENCOUNTER — APPOINTMENT (OUTPATIENT)
Dept: CT IMAGING | Facility: HOSPITAL | Age: 70
End: 2025-01-16
Payer: MEDICARE

## 2025-01-16 ENCOUNTER — HOSPITAL ENCOUNTER (EMERGENCY)
Facility: HOSPITAL | Age: 70
Discharge: HOME | End: 2025-01-16
Attending: STUDENT IN AN ORGANIZED HEALTH CARE EDUCATION/TRAINING PROGRAM
Payer: MEDICARE

## 2025-01-16 VITALS
DIASTOLIC BLOOD PRESSURE: 105 MMHG | SYSTOLIC BLOOD PRESSURE: 135 MMHG | BODY MASS INDEX: 36.7 KG/M2 | OXYGEN SATURATION: 97 % | HEART RATE: 79 BPM | HEIGHT: 63 IN | TEMPERATURE: 97.3 F | RESPIRATION RATE: 17 BRPM

## 2025-01-16 DIAGNOSIS — H81.391 PERIPHERAL VERTIGO INVOLVING RIGHT EAR: Primary | ICD-10-CM

## 2025-01-16 DIAGNOSIS — J10.1 INFLUENZA A: ICD-10-CM

## 2025-01-16 LAB
ALBUMIN SERPL-MCNC: 4 G/DL (ref 3.5–5.2)
ALBUMIN/GLOB SERPL: 1.3 G/DL
ALP SERPL-CCNC: 81 U/L (ref 39–117)
ALT SERPL W P-5'-P-CCNC: 10 U/L (ref 1–33)
AMORPH URATE CRY URNS QL MICRO: ABNORMAL /HPF
ANION GAP SERPL CALCULATED.3IONS-SCNC: 9.8 MMOL/L (ref 5–15)
AST SERPL-CCNC: 17 U/L (ref 1–32)
BACTERIA UR QL AUTO: ABNORMAL /HPF
BASOPHILS # BLD AUTO: 0.05 10*3/MM3 (ref 0–0.2)
BASOPHILS NFR BLD AUTO: 0.4 % (ref 0–1.5)
BILIRUB SERPL-MCNC: 0.3 MG/DL (ref 0–1.2)
BILIRUB UR QL STRIP: NEGATIVE
BUN SERPL-MCNC: 10 MG/DL (ref 8–23)
BUN/CREAT SERPL: 12.3 (ref 7–25)
CALCIUM SPEC-SCNC: 9.3 MG/DL (ref 8.6–10.5)
CHLORIDE SERPL-SCNC: 107 MMOL/L (ref 98–107)
CLARITY UR: CLEAR
CO2 SERPL-SCNC: 25.2 MMOL/L (ref 22–29)
COLOR UR: YELLOW
CREAT SERPL-MCNC: 0.81 MG/DL (ref 0.57–1)
DEPRECATED RDW RBC AUTO: 49.5 FL (ref 37–54)
EGFRCR SERPLBLD CKD-EPI 2021: 78.7 ML/MIN/1.73
EOSINOPHIL # BLD AUTO: 0.28 10*3/MM3 (ref 0–0.4)
EOSINOPHIL NFR BLD AUTO: 2.4 % (ref 0.3–6.2)
ERYTHROCYTE [DISTWIDTH] IN BLOOD BY AUTOMATED COUNT: 15.2 % (ref 12.3–15.4)
FLUAV RNA RESP QL NAA+PROBE: DETECTED
FLUBV RNA RESP QL NAA+PROBE: NOT DETECTED
GLOBULIN UR ELPH-MCNC: 3.1 GM/DL
GLUCOSE SERPL-MCNC: 132 MG/DL (ref 65–99)
GLUCOSE UR STRIP-MCNC: NEGATIVE MG/DL
HCT VFR BLD AUTO: 42.1 % (ref 34–46.6)
HGB BLD-MCNC: 13.6 G/DL (ref 12–15.9)
HGB UR QL STRIP.AUTO: NEGATIVE
HYALINE CASTS UR QL AUTO: ABNORMAL /LPF
IMM GRANULOCYTES # BLD AUTO: 0.05 10*3/MM3 (ref 0–0.05)
IMM GRANULOCYTES NFR BLD AUTO: 0.4 % (ref 0–0.5)
KETONES UR QL STRIP: NEGATIVE
LEUKOCYTE ESTERASE UR QL STRIP.AUTO: ABNORMAL
LYMPHOCYTES # BLD AUTO: 2.19 10*3/MM3 (ref 0.7–3.1)
LYMPHOCYTES NFR BLD AUTO: 18.6 % (ref 19.6–45.3)
MCH RBC QN AUTO: 28.7 PG (ref 26.6–33)
MCHC RBC AUTO-ENTMCNC: 32.3 G/DL (ref 31.5–35.7)
MCV RBC AUTO: 88.8 FL (ref 79–97)
MONOCYTES # BLD AUTO: 0.51 10*3/MM3 (ref 0.1–0.9)
MONOCYTES NFR BLD AUTO: 4.3 % (ref 5–12)
NEUTROPHILS NFR BLD AUTO: 73.9 % (ref 42.7–76)
NEUTROPHILS NFR BLD AUTO: 8.68 10*3/MM3 (ref 1.7–7)
NITRITE UR QL STRIP: NEGATIVE
NRBC BLD AUTO-RTO: 0 /100 WBC (ref 0–0.2)
PH UR STRIP.AUTO: 7.5 [PH] (ref 4.5–8)
PLATELET # BLD AUTO: 249 10*3/MM3 (ref 140–450)
PMV BLD AUTO: 9.7 FL (ref 6–12)
POTASSIUM SERPL-SCNC: 3.3 MMOL/L (ref 3.5–5.2)
PROT SERPL-MCNC: 7.1 G/DL (ref 6–8.5)
PROT UR QL STRIP: NEGATIVE
RBC # BLD AUTO: 4.74 10*6/MM3 (ref 3.77–5.28)
RBC # UR STRIP: ABNORMAL /HPF
REF LAB TEST METHOD: ABNORMAL
RSV RNA RESP QL NAA+PROBE: NOT DETECTED
SARS-COV-2 RNA RESP QL NAA+PROBE: NOT DETECTED
SODIUM SERPL-SCNC: 142 MMOL/L (ref 136–145)
SP GR UR STRIP: 1.02 (ref 1–1.03)
SQUAMOUS #/AREA URNS HPF: ABNORMAL /HPF
UROBILINOGEN UR QL STRIP: ABNORMAL
WBC # UR STRIP: ABNORMAL /HPF
WBC CASTS #/AREA URNS LPF: ABNORMAL /LPF
WBC NRBC COR # BLD AUTO: 11.76 10*3/MM3 (ref 3.4–10.8)

## 2025-01-16 PROCEDURE — 96376 TX/PRO/DX INJ SAME DRUG ADON: CPT

## 2025-01-16 PROCEDURE — 96375 TX/PRO/DX INJ NEW DRUG ADDON: CPT

## 2025-01-16 PROCEDURE — 25010000002 ONDANSETRON PER 1 MG: Performed by: STUDENT IN AN ORGANIZED HEALTH CARE EDUCATION/TRAINING PROGRAM

## 2025-01-16 PROCEDURE — 85025 COMPLETE CBC W/AUTO DIFF WBC: CPT | Performed by: STUDENT IN AN ORGANIZED HEALTH CARE EDUCATION/TRAINING PROGRAM

## 2025-01-16 PROCEDURE — 99284 EMERGENCY DEPT VISIT MOD MDM: CPT | Performed by: STUDENT IN AN ORGANIZED HEALTH CARE EDUCATION/TRAINING PROGRAM

## 2025-01-16 PROCEDURE — 25810000003 SODIUM CHLORIDE 0.9 % SOLUTION: Performed by: STUDENT IN AN ORGANIZED HEALTH CARE EDUCATION/TRAINING PROGRAM

## 2025-01-16 PROCEDURE — 87637 SARSCOV2&INF A&B&RSV AMP PRB: CPT | Performed by: STUDENT IN AN ORGANIZED HEALTH CARE EDUCATION/TRAINING PROGRAM

## 2025-01-16 PROCEDURE — 70450 CT HEAD/BRAIN W/O DYE: CPT

## 2025-01-16 PROCEDURE — 80053 COMPREHEN METABOLIC PANEL: CPT | Performed by: STUDENT IN AN ORGANIZED HEALTH CARE EDUCATION/TRAINING PROGRAM

## 2025-01-16 PROCEDURE — 81001 URINALYSIS AUTO W/SCOPE: CPT | Performed by: STUDENT IN AN ORGANIZED HEALTH CARE EDUCATION/TRAINING PROGRAM

## 2025-01-16 PROCEDURE — 25010000002 KETOROLAC TROMETHAMINE PER 15 MG: Performed by: STUDENT IN AN ORGANIZED HEALTH CARE EDUCATION/TRAINING PROGRAM

## 2025-01-16 PROCEDURE — 96374 THER/PROPH/DIAG INJ IV PUSH: CPT

## 2025-01-16 PROCEDURE — 96361 HYDRATE IV INFUSION ADD-ON: CPT

## 2025-01-16 RX ORDER — KETOROLAC TROMETHAMINE 30 MG/ML
15 INJECTION, SOLUTION INTRAMUSCULAR; INTRAVENOUS ONCE
Status: COMPLETED | OUTPATIENT
Start: 2025-01-16 | End: 2025-01-16

## 2025-01-16 RX ORDER — ACETAMINOPHEN 500 MG
1000 TABLET ORAL EVERY 6 HOURS PRN
Qty: 30 TABLET | Refills: 0 | Status: SHIPPED | OUTPATIENT
Start: 2025-01-16 | End: 2025-01-23

## 2025-01-16 RX ORDER — ONDANSETRON 4 MG/1
4 TABLET, ORALLY DISINTEGRATING ORAL EVERY 8 HOURS PRN
Qty: 10 TABLET | Refills: 0 | Status: SHIPPED | OUTPATIENT
Start: 2025-01-16

## 2025-01-16 RX ORDER — MECLIZINE HYDROCHLORIDE 25 MG/1
25 TABLET ORAL 3 TIMES DAILY PRN
Qty: 14 TABLET | Refills: 0 | Status: SHIPPED | OUTPATIENT
Start: 2025-01-16

## 2025-01-16 RX ORDER — MECLIZINE HYDROCHLORIDE 25 MG/1
50 TABLET ORAL ONCE
Status: COMPLETED | OUTPATIENT
Start: 2025-01-16 | End: 2025-01-16

## 2025-01-16 RX ORDER — ACETAMINOPHEN 500 MG
1000 TABLET ORAL ONCE
Status: COMPLETED | OUTPATIENT
Start: 2025-01-16 | End: 2025-01-16

## 2025-01-16 RX ORDER — ONDANSETRON 2 MG/ML
4 INJECTION INTRAMUSCULAR; INTRAVENOUS ONCE
Status: COMPLETED | OUTPATIENT
Start: 2025-01-16 | End: 2025-01-16

## 2025-01-16 RX ORDER — OSELTAMIVIR PHOSPHATE 75 MG/1
75 CAPSULE ORAL ONCE
Status: COMPLETED | OUTPATIENT
Start: 2025-01-16 | End: 2025-01-16

## 2025-01-16 RX ORDER — OSELTAMIVIR PHOSPHATE 75 MG/1
75 CAPSULE ORAL 2 TIMES DAILY
Qty: 10 CAPSULE | Refills: 0 | Status: SHIPPED | OUTPATIENT
Start: 2025-01-16 | End: 2025-01-21

## 2025-01-16 RX ADMIN — SODIUM CHLORIDE 1000 ML: 9 INJECTION, SOLUTION INTRAVENOUS at 20:43

## 2025-01-16 RX ADMIN — MECLIZINE HYDROCHLORIDE 50 MG: 25 TABLET ORAL at 21:13

## 2025-01-16 RX ADMIN — KETOROLAC TROMETHAMINE 15 MG: 30 INJECTION, SOLUTION INTRAMUSCULAR; INTRAVENOUS at 23:00

## 2025-01-16 RX ADMIN — OSELTAMIVIR PHOSPHATE 75 MG: 75 CAPSULE ORAL at 22:59

## 2025-01-16 RX ADMIN — ONDANSETRON 4 MG: 2 INJECTION INTRAMUSCULAR; INTRAVENOUS at 20:58

## 2025-01-16 RX ADMIN — ACETAMINOPHEN 1000 MG: 500 TABLET, FILM COATED ORAL at 23:00

## 2025-01-16 RX ADMIN — ONDANSETRON 4 MG: 2 INJECTION INTRAMUSCULAR; INTRAVENOUS at 23:00

## 2025-01-17 NOTE — ED PROVIDER NOTES
Subjective   History of Present Illness  69 female hx tinnitus R ear presents to er w/ cc of 2 hrs of intermittent dizziness described as the room spinning (Vertigo) associated sx of nausea w/ nb/nb emesis, preceding HA occurring in context of chronic R-ear tinnitus; This has never happened before. Onset with headmovements ,resolves with lack of head movement. Denies neck pain, trauma, ear pain. ROS otherwise neg, vitals are wnls on exam pt appears uncomfortable, PERRLA, EOMI, left-sided horizontal nystagmus present (unilat). Pt is vomitting during exam.   Review of Systems    Past Medical History:   Diagnosis Date    Abnormal Pap smear of cervix     Arthritis     At risk for sleep apnea     STOP BANG 5    Cervical dysplasia     s/p LEEP    Colon polyps     Cystocele with rectocele     Diverticulitis     Dyspepsia     Gastritis     GERD (gastroesophageal reflux disease)     Hearing loss     Heartburn     Hemorrhoids     History of cervical dysplasia     History of hypertension     OFF MED SINCE 1/2018    Hypertension     Hypothyroidism     Indigestion     Joint pain     Mastodynia     Migraine     Ovarian cyst 11/2015    RIGHT    Primary osteoarthritis of right knee     Reflux esophagitis     Slow to wake up after anesthesia     Tinnitus     RT EAR    Urinary tract infection     recurrent UTIs a few years ago    Uterine prolapse     Varicose veins of bilateral lower extremities with other complications        Allergies   Allergen Reactions    Levofloxacin Myalgia     Lower extremities  Other reaction(s): Arthralgia (Joint Pain)  Other reaction(s): Arthralgia (joint pain)       Past Surgical History:   Procedure Laterality Date    CERVICAL BIOPSY  W/ LOOP ELECTRODE EXCISION      COLONOSCOPY N/A 4/19/2021    Procedure: COLONOSCOPY;  Surgeon: Simeon Sheriff MD;  Location: [facility];  Service: Gastroenterology;  Laterality: N/A;  CECAL TIME AT 0922  DIVERTICULOSIS    COLONOSCOPY W/ POLYPECTOMY N/A  12/04/2015    SIGMOID DIVERTICULOSIS, 1 TUBULAR ADENOMA COLON POLYP, RESCOPE IN 5 YRS, DR. GENARO CONN    D & C HYSTEROSCOPY N/A     DILATATION AND CURETTAGE      DUB    ENDOSCOPY N/A 4/29/2016    NODULAR GASTRITIS, REFLUX ESOPHAGITIS, BX:BENIGN, DR. GENARO CONN AT [facility]    HEMORRHOIDECTOMY N/A 6/21/2018    Procedure: HEMORRHOIDECTOMY x3;  Surgeon: Valery Alanis MD;  Location: [facility] OR;  Service: General    TUBAL ABDOMINAL LIGATION Bilateral     VEIN LIGATION AND STRIPPING Left        Family History   Problem Relation Age of Onset    Heart disease Father     Diabetes Mother     Colon cancer Mother 75    Colon polyps Mother     Hypertension Sister     Colon polyps Sister     Deep vein thrombosis Sister     Pulmonary embolism Sister     Hypertension Sister     Malig Hyperthermia Neg Hx     Breast cancer Neg Hx     Ovarian cancer Neg Hx        Social History     Socioeconomic History    Marital status:    Tobacco Use    Smoking status: Former     Types: Cigarettes    Smokeless tobacco: Never    Tobacco comments:     QUIT 39 YEARS AGO   Vaping Use    Vaping status: Never Used   Substance and Sexual Activity    Alcohol use: Yes     Comment: occasionally    Drug use: No    Sexual activity: Yes     Partners: Male     Birth control/protection: Surgical, Post-menopausal     Comment: BTL           Objective   Physical Exam    Procedures           ED Course                                                       Medical Decision Making    69 female hx tinnitus R ear presents to er w/ cc of 2 hrs of intermittent dizziness described as the room spinning (Vertigo) associated sx of nausea w/ nb/nb emesis, preceding HA occurring in context of chronic R-ear tinnitus; This has never happened before. Onset with headmovements ,resolves with lack of head movement. Denies neck pain, trauma, ear pain. ROS otherwise neg, vitals are wnls on exam pt appears uncomfortable, PERRLA, EOMI, left-sided horizontal nystagmus  present (unilat). Pt is vomitting during exam.     Concern for peripheral vertigo; the headache with vertigo would be concerning for ich; eval with head ct wo, cbc/cmp; tx w/ meclizine 50 mg PO, 1 L NS IV    10:44 pm sx resolved; influenza A+; ctb negative for ich;    Cbc/cmp normal    Dc home    Tx vertigo with meclizine PO  Tx flu with tamiflu po    Hst pt  Dsp home  Final diagnoses:   Peripheral vertigo involving right ear   Influenza A       ED Disposition  ED Disposition       None            No follow-up provider specified.       Medication List        New Prescriptions      acetaminophen 500 MG tablet  Commonly known as: TYLENOL  Take 2 tablets by mouth Every 6 (Six) Hours As Needed for Mild Pain for up to 7 days.     oseltamivir 75 MG capsule  Commonly known as: Tamiflu  Take 1 capsule by mouth 2 (Two) Times a Day for 5 days.            Changed      meclizine 25 MG tablet  Commonly known as: ANTIVERT  Take 1 tablet by mouth 3 (Three) Times a Day As Needed for Dizziness.  What changed: See the new instructions.     ondansetron ODT 4 MG disintegrating tablet  Commonly known as: ZOFRAN-ODT  Take 1 tablet by mouth Every 8 (Eight) Hours As Needed for Nausea.  What changed:   medication strength  how much to take  how to take this  when to take this  reasons to take this  additional instructions               Where to Get Your Medications        These medications were sent to [facility] [id number] - [place] - [address] - [phone]  - [phone]  [address] [address]      Phone: [phone]   acetaminophen 500 MG tablet  meclizine 25 MG tablet  ondansetron ODT 4 MG disintegrating tablet  oseltamivir 75 MG capsule            Jarrett Eaton MD  01/16/25 [id number]

## 2025-02-04 ENCOUNTER — OFFICE VISIT (OUTPATIENT)
Dept: OBSTETRICS AND GYNECOLOGY | Facility: CLINIC | Age: 70
End: 2025-02-04
Payer: MEDICARE

## 2025-02-04 VITALS
BODY MASS INDEX: 33.13 KG/M2 | SYSTOLIC BLOOD PRESSURE: 130 MMHG | WEIGHT: 187 LBS | HEIGHT: 63 IN | DIASTOLIC BLOOD PRESSURE: 82 MMHG

## 2025-02-04 DIAGNOSIS — N81.10 PELVIC ORGAN PROLAPSE QUANTIFICATION STAGE 3 CYSTOCELE: Primary | ICD-10-CM

## 2025-02-04 DIAGNOSIS — Z13.89 SCREENING FOR GENITOURINARY CONDITION: ICD-10-CM

## 2025-02-04 RX ORDER — PHENTERMINE HYDROCHLORIDE 37.5 MG/1
TABLET ORAL
COMMUNITY
Start: 2024-02-12

## 2025-02-04 NOTE — PROGRESS NOTES
"Subjective     Chief Complaint   Patient presents with    Vaginal Prolapse       Nadia Valverde is a 69 y.o.  whose LMP is No LMP recorded (lmp unknown). Patient is postmenopausal.. She presents today for prolapse. She was seen in  for her AE and was ref to pelvic floor PT for rectocele, cystocele and vaginal prolapse. She completed pelvic floor PT and felt like things were going well. She then started exercising and things really improved after that. She has lost #25. She has also been doing the Mediterranean diet.  She then got Covid in  and states, \"I have never coughed so much in my life.\"  Since then, she thinks her prolapse is worse. She has restarted pelvic floor exercises but it has helped some. She feels like she has difficulty emptying her bladder. She urinates well during the morning but as the day goes along, she is struggling to empty her bladder. She feels like when she goes, she only empties a little at a time.     HPI    HPI    The following portions of the patient's history were reviewed and updated as appropriate:vital signs, allergies, current medications, past medical history, past social history, past surgical history, and problem list      Review of Systems     Review of Systems   Constitutional: Negative.    Genitourinary:  Positive for difficulty urinating and pelvic pressure.       Objective      /82   Ht 160 cm (63\")   Wt 84.8 kg (187 lb)   LMP  (LMP Unknown)   Breastfeeding No   BMI 33.13 kg/m²     Physical Exam    Physical Exam  Constitutional:       Appearance: Normal appearance.   Genitourinary:     Labia:         Right: No rash, tenderness or lesion.         Left: No rash, tenderness or lesion.       Vagina: No signs of injury and foreign body. No vaginal discharge or erythema.      Cervix: No cervical motion tenderness, discharge, friability, lesion, erythema or cervical bleeding.      Uterus: Not deviated, not enlarged, not fixed and not tender.       " Comments: Rectocele and cystocele noted   Musculoskeletal:         General: Normal range of motion.   Skin:     General: Skin is warm and dry.   Neurological:      General: No focal deficit present.      Mental Status: She is alert and oriented to person, place, and time.   Psychiatric:         Mood and Affect: Mood normal.         Behavior: Behavior normal.         Lab Review   Labs: No data reviewed     Imaging   No data reviewed    Assessment  Diagnoses and all orders for this visit:    1. Pelvic organ prolapse quantification stage 3 cystocele (Primary)  -     Ambulatory Referral to Gynecologic Urology    2. Screening for genitourinary condition  -     Cancel: POC Urinalysis Dipstick        Additional Assessment:   Pelvic organ prolapse    Plan     Pelvic organ prolapse- Disc options of management including pelvic floor PT, pessary or urogyn consult. She desires urogyn consult. Ref placed. Rev jose s/s.     RTO PRN     Leighann Thompson, APRN  2/10/2025

## 2025-02-10 PROBLEM — N81.10 PELVIC ORGAN PROLAPSE QUANTIFICATION STAGE 3 CYSTOCELE: Status: ACTIVE | Noted: 2025-02-10

## 2025-04-21 ENCOUNTER — TRANSCRIBE ORDERS (OUTPATIENT)
Dept: ADMINISTRATIVE | Facility: HOSPITAL | Age: 70
End: 2025-04-21
Payer: MEDICARE

## 2025-04-21 ENCOUNTER — TELEPHONE (OUTPATIENT)
Dept: OBSTETRICS AND GYNECOLOGY | Facility: CLINIC | Age: 70
End: 2025-04-21

## 2025-04-21 DIAGNOSIS — Z12.31 VISIT FOR SCREENING MAMMOGRAM: Primary | ICD-10-CM

## 2025-04-21 NOTE — TELEPHONE ENCOUNTER
Caller: Nadia Valverde    Relationship: Self    Best call back number: [phone]    What orders are you requesting (i.e. lab or imaging): IMAGING - DEXA SCAN    In what timeframe would the patient need to come in: PT LAST DEXA DONE ON 09/25/23    Where will you receive your lab/imaging services: [facility]    Additional notes: PLEASE SEND ORDER OVER FOR DEXA AND NOTIFY PT.

## 2025-04-23 DIAGNOSIS — Z78.0 MENOPAUSE PRESENT: Primary | ICD-10-CM

## 2025-04-25 ENCOUNTER — HOSPITAL ENCOUNTER (OUTPATIENT)
Dept: MAMMOGRAPHY | Facility: HOSPITAL | Age: 70
Discharge: HOME | End: 2025-04-25
Admitting: OBSTETRICS & GYNECOLOGY
Payer: MEDICARE

## 2025-04-25 DIAGNOSIS — Z12.31 VISIT FOR SCREENING MAMMOGRAM: ICD-10-CM

## 2025-04-25 PROCEDURE — 77067 SCR MAMMO BI INCL CAD: CPT

## 2025-04-25 PROCEDURE — 77067 SCR MAMMO BI INCL CAD: CPT | Performed by: RADIOLOGY

## 2025-04-25 PROCEDURE — 77063 BREAST TOMOSYNTHESIS BI: CPT | Performed by: RADIOLOGY

## 2025-04-25 PROCEDURE — 77063 BREAST TOMOSYNTHESIS BI: CPT

## (undated) DEVICE — GLV SURG SENSICARE GREEN W/ALOE PF LF 7 STRL

## (undated) DEVICE — SPNG GZ WOVN 4X4IN 12PLY 10/BX STRL

## (undated) DEVICE — GLV SURG SENSICARE PI MIC PF SZ7.5 LF STRL

## (undated) DEVICE — GLV SURG SENSICARE ALOE LF PF SZ7.5 GRN

## (undated) DEVICE — SYR LL 3CC

## (undated) DEVICE — ENSEAL TRIO TEMPERATURE CONTOLLED TISSUE SEALING TECHNOLOGY DISPOSABLE TISSUE SEALING DEVICE TAPTRONIC TRIGGER ACTIVATED POWER 3MM CURVED JAW: Brand: ENSEAL

## (undated) DEVICE — BW-412T DISP COMBO CLEANING BRUSH: Brand: SINGLE USE COMBINATION CLEANING BRUSH

## (undated) DEVICE — STERILE LATEX POWDER-FREE SURGICAL GLOVESWITH NITRILE COATING: Brand: PROTEXIS

## (undated) DEVICE — JACKT LAB F/R KNIT CUFF/COLR XLG BLU

## (undated) DEVICE — PANTY KNIT MATERN L/XL

## (undated) DEVICE — GOWN SURG AERO CHROME XL

## (undated) DEVICE — Device

## (undated) DEVICE — SUCTION CANISTER, 3000CC,SAFELINER: Brand: DEROYAL

## (undated) DEVICE — KT ORCA ORCAPOD DISP STRL

## (undated) DEVICE — LOU MINOR PROCEDURE: Brand: MEDLINE INDUSTRIES, INC.

## (undated) DEVICE — GLV SURG BIOGEL LTX PF 7

## (undated) DEVICE — DRAPE,UTILITY,TAPE,15X26,STERILE: Brand: MEDLINE

## (undated) DEVICE — IRRIGATOR BULB ASEPTO 60CC STRL

## (undated) DEVICE — ANTIBACTERIAL UNDYED BRAIDED (POLYGLACTIN 910), SYNTHETIC ABSORBABLE SUTURE: Brand: COATED VICRYL

## (undated) DEVICE — NDL HYPO ECLPS SFTY 22G 1 1/2IN

## (undated) DEVICE — GOWN,NON-REINFORCED,SIRUS,SET IN SLV,XL: Brand: MEDLINE

## (undated) DEVICE — SPNG LAP 18X18IN LF STRL PK/5

## (undated) DEVICE — PREP SOL POVIDONE/IODINE BT 4OZ